# Patient Record
Sex: FEMALE | Race: WHITE | NOT HISPANIC OR LATINO | Employment: STUDENT | URBAN - METROPOLITAN AREA
[De-identification: names, ages, dates, MRNs, and addresses within clinical notes are randomized per-mention and may not be internally consistent; named-entity substitution may affect disease eponyms.]

---
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
## 2025-01-03 ENCOUNTER — NURSE ONLY (OUTPATIENT)
Age: 20
End: 2025-01-03
Attending: HOSPITALIST
Payer: MEDICAID

## 2025-01-03 ENCOUNTER — APPOINTMENT (OUTPATIENT)
Dept: FAMILY MEDICINE | Age: 20
End: 2025-01-03

## 2025-01-03 ENCOUNTER — OFFICE VISIT (OUTPATIENT)
Age: 20
End: 2025-01-03
Attending: STUDENT IN AN ORGANIZED HEALTH CARE EDUCATION/TRAINING PROGRAM
Payer: MEDICAID

## 2025-01-03 DIAGNOSIS — K31.819 GASTRIC AVM: Primary | ICD-10-CM

## 2025-01-03 NOTE — PROGRESS NOTES
Patient Name: Norm Roque  YOB: 2005  Date of Visit: 1/3/2025    Reason for visit: Ms. Roque was seen for the purposes of genetic counseling to rule out hereditary hemorrhagic telangiectasia (HHT) due to her recent diagnosis of 2 gastric AVMs at age 19y.    Referring Provider: Jhoan Adrian MD  Additional Provider(s): Irena Fay MD    Medical History: Ms. Roque is a pleasant 19 year old female. She recently presented to the [facility] ED on 2024 dt a c/o epigastric/abdominal pain and scant hematemesis. She reported a h/o H. Pylori infection and was seen in the [facility] ED 2 days prior where she had abdominal CT and lab work which was unremarkable. An EGD was performed which noted 2 AVMs, one medium sized in the gastric body and 1 small sized near the incisura, both of which were treated with APC and hemostatic clips put in place.    Ms. Roque denies any h/o epistaxis or cutaneous/mucosal telangiectases.     Ms. Roque achieved menarche at approximately 13 years of age, is pre-menopausal, and has never been pregnant. Ms. Roque has a 0-year history of oral contraceptive use and denies any fertility or hormone replacement use.      Relevant Family History: Ms. Roque has 2 sisters (21y, 17y) and 1 brother (7y).     Her mother (46y) is generally well. There are 3 maternal aunts and 4 maternal uncles. The MGMA is living in her 70s. The MGFA is  in his 60-70s dt a MI with a h/o HLD and fatty liver/cirrhosis.    Her father (44y) was dx with thyroid cancer ~40y s/p surgery. There is 1 paternal aunt and 1 paternal uncle. The PGMA is ~82y. The MGFA is  ~75y reportedly due to complications of bleeding gastric ulcers.     The rest of the family history is negative for other significant genetic conditions, cancers, or birth defects of any kind. See scanned pedigree for full family history reported during the session.    Ms. Roque's  maternal and paternal ethnicity is [place]. She notes consanguinity in her parents who are 1st cousins.    Summary: I reviewed with Ms. Roque that because she was found to have 2 gastric AVMs on the EGD done in the ED, she was referred to genetic counseling to r/o HHT. We discussed that gastrointestinal AVMs are one of the features suggestive of HHT, but they also can occur sporadically. Genetic testing of the genes causative of HHT can be considered in individuals with gastric AVMs. Such testing is often done as a multi-gene panel. We discussed general features of HHT and the possible implications for her should she be found to have it (need for additional workup to assess for presence of other AVMs/clinical features) and possible family implications (cascade testing for relatives). She was given printed information about HHT to take with her.    Clinical characteristics of hereditary hemorrhagic telangiectasia  Hereditary hemorrhagic telangiectasia (HHT, aka: Osler-Weber-Rendu) is an autosomal dominant condition that affects around 1 in 5000 individuals. HHT is characterized by the presence of multiple arteriovenous malformations (AVMs) that lack intervening capillaries and result in direct connections between arteries and veins. The most common clinical manifestation is spontaneous and recurrent nosebleeds (epistaxis) beginning on average at age 12 years. Telangiectases (small AVMs) are most evident on the lips, tongue, buccal mucosa, face, chest, and fingers. The average age of onset is generally later than epistaxis, but may be during childhood. Large AVMs often cause symptoms when they occur in the lungs, liver, or brain; complications from bleeding or shunting may be sudden and catastrophic. Approximately 25% of individuals with HHT have GI bleeding, which most commonly begins after age 50 years. Pulmonary hypertension is another pulmonary vascular manifestation of HHT, although it is much less common than  pulmonary AVMs. Individuals with HHT caused by SMAD4 mutations may also present with juvenile polyposis syndrome, as such increased endoscopic surveillance beginning in childhood is advised.    Diagnosis/testing  The diagnosis of HHT is established in a proband with three or more of the following clinical features: epistaxis, mucocutaneous telangiectases, visceral AVMs, and/or a family history of HHT. Identification of a heterozygous pathogenic variant in ACVRL1, ENG, GDF2, or SMAD4 establishes the diagnosis if clinical features are inconclusive.    Inheritance  HHT is inherited in an autosomal dominant manner with considerable intrafamilial variability. Most individuals have an affected parent. Each child of an affected individual and the sibs of most affected individuals are at a 50% risk of inheriting the pathogenic variant. Prenatal testing is possible for a pregnancy at increased risk if the pathogenic variant in the family is known. Molecular genetic testing is offered to at-risk family members if the germline pathogenic variant has been identified in the family. If the pathogenic variant in the family is not known, at-risk family members should be evaluated for signs and symptoms of HHT and screening should be offered to at-risk family members if the diagnosis cannot be ruled out.    Management  Treatment of manifestations: Nosebleeds are treated with humidification, nasal lubricants, hemostatic products, laser ablation, sclerotherapy, nasal closure, and oral or topical medications. GI bleeding is treated with iron replacement therapy and (if needed) endoscopic ablation, surgical resection of bleeding sites, and/or medical therapy. Pulmonary AVMs with a feeding vessel 1 mm or greater in diameter require consideration of occlusion. Cerebral AVMs are treated, when indicated by location or symptoms, by surgery, embolotherapy, and/or stereotactic radiosurgery. Symptomatic hepatic AVMs are managed medically; liver  transplantation is recommended for individuals who do not respond to medical therapy and who develop hepatic failure. Women with HHT considering pregnancy are screened and treated for pulmonary and cerebral AVMs; pulmonary AVMs discovered during pregnancy are treated during the second trimester. Iron replacement is preferred for anemia, but transfusion of packed red blood cells may be necessary for symptomatic anemia.    Prevention of secondary complications: When pulmonary AVMs are present or suspected, antibiotic prophylaxis for dental and invasive procedures and a filter on intravenous lines to prevent air bubbles from being inadvertently infused is recommended.    Surveillance: Annual evaluations for anemia; supine and sitting pulse-oximetry every one to two years during childhood; evaluation for pulmonary AVMs every five years with contrast echocardiography by age ten years; head MRI in infancy and then rescreening for cerebral AVMs by brain MRI after puberty; periodic screening for gastrointestinal polyps and malignant change in persons with juvenile polyps.    Agents/circumstances to avoid: Vigorous nose blowing; lifting heavy objects; straining during bowel movements; electric or chemical cautery for nosebleeds; anticoagulant and anti-inflammatory agents (including aspirin) in individuals with significant nose or GI bleeding; scuba diving unless contrast echocardiography within the last five years was negative for evidence of a right to left shunt; liver biopsy.    If testing is performed, three results are possible: positive, negative, and variant of uncertain significance.  A positive result indicates a pathogenic variant (harmful gene mutation) has been identified, and there is an increased risk for the cancers associated with the specific gene.  Since pathogenic variants in most HHT susceptibility genes are inherited in an autosomal dominant fashion, siblings and children of individuals with a pathogenic  variant have a 50% risk of carrying the same familial pathogenic variant as well.      A negative test result would indicate that no pathogenic variant was identified in a disease susceptibility gene.  While testing detects gene mutations, it is possible for a genetic variant to be present and go undetected.  It is also possible for a genetic variant to be located in a gene other than those being tested.     A variant of uncertain significance means that a change has been identified a disease susceptibility gene; however, it is uncertain if the variant is pathogenic or a non-deleterious (benign) change.  With time, the variant may be reclassified as either pathogenic or benign. This is generally treated as a negative results with no changes in medical management nor testing of family members typically advised.     Ms. Roque appeared to understand the information presented. On the day of the visit Ms. Roque elected to proceed with genetic testing for HHT. My office will call Ms. Roque as soon as results are received; post-test counseling can be scheduled at that time. Thank you for allowing me to participate in the care of your patient; please do not hesitate to contact my office if you have any questions or concerns, [phone].    Plan:   Blood was drawn and sent out for [facility]'s HHT and vascular malformations panel (6 genes, ACVRL1, ENG, EPHB4, GDF2, RASA1, SMAD4; TAT: ~2 weeks).    The Genetics office will call Ms. Roque when results are available.  Recommendations for Ms. Roque and family members will depend the above genetic testing results.      Send to: [facility]  Time spent with patient: 40 minutes      Gabriela Milligan MS, [facility]

## 2025-01-07 ENCOUNTER — APPOINTMENT (OUTPATIENT)
Dept: FAMILY MEDICINE | Age: 20
End: 2025-01-07

## 2025-01-13 ENCOUNTER — GENETICS ENCOUNTER (OUTPATIENT)
Dept: HEMATOLOGY/ONCOLOGY | Facility: HOSPITAL | Age: 20
End: 2025-01-13

## 2025-01-13 NOTE — PROGRESS NOTES
Patient Name: Norm Roque  YOB: 2005    Referring Provider: Jhoan Adrian MD  Additional Provider(s): Irena Fay MD     Reason for Referral:  Ms. Roque had genetic testing performed on 1/3/2025 to rule out hereditary hemorrhagic telangiectasia (HHT) due to her recent diagnosis of 2 gastric AVMs at age 19y.      Genetic Testing Result:  Negative. No pathogenic variant was found in the following 6 genes on the [facility] hereditary hemorrhagic telangiectasia and vascular malformations panel: ACVRL1, ENG, EPHB4, GDF2, RASA1, SMAD4. Please refer to the report from [facility] for additional testing information. These results were discussed with Ms. Roque via telephone on 1/13/2025.    Summary and Plan:   These results indicate that Ms. Roque was not found to have a pathogenic variant (harmful genetic mutation) in any of the genes listed above. No pathogenic variants associated with hereditary hemorrhagic telangiectasia (HHT) was identified. She was not found to have a genetic diagnosis of HHT. The limitations of the testing were discussed with Ms. Roque including the chance that a pathogenic variant in a gene other than those included in this analysis might be the cause of condition(s) in Ms. Roque or in relatives.     I encouraged Ms. Roque to share the genetic test results with her other relatives so that they may discuss the implications of this information with their health providers. Ms. Roque is also encouraged to contact me on an annual basis to learn if there have been any updates in genetic information that would apply or if there are changes in the personal and/or family history. Please do not hesitate to contact my office if you have any questions or concerns, [phone].     Gabriela Milligan MS, [facility]

## 2025-01-17 ENCOUNTER — APPOINTMENT (OUTPATIENT)
Dept: GASTROENTEROLOGY | Age: 20
End: 2025-01-17
Attending: STUDENT IN AN ORGANIZED HEALTH CARE EDUCATION/TRAINING PROGRAM

## 2025-01-17 ENCOUNTER — HOSPITAL ENCOUNTER (OUTPATIENT)
Facility: HOSPITAL | Age: 20
Setting detail: OBSERVATION
Discharge: HOME | End: 2025-01-20
Attending: PEDIATRICS | Admitting: HOSPITALIST
Payer: MEDICAID

## 2025-01-17 ENCOUNTER — APPOINTMENT (OUTPATIENT)
Dept: GENERAL RADIOLOGY | Facility: HOSPITAL | Age: 20
End: 2025-01-17
Attending: PEDIATRICS
Payer: MEDICAID

## 2025-01-17 VITALS
OXYGEN SATURATION: 100 % | HEART RATE: 83 BPM | DIASTOLIC BLOOD PRESSURE: 67 MMHG | SYSTOLIC BLOOD PRESSURE: 102 MMHG | BODY MASS INDEX: 17.66 KG/M2 | RESPIRATION RATE: 16 BRPM | HEIGHT: 62 IN | WEIGHT: 96 LBS

## 2025-01-17 DIAGNOSIS — K92.0 HEMATEMESIS WITH NAUSEA: ICD-10-CM

## 2025-01-17 DIAGNOSIS — Q27.30 AVM (ARTERIOVENOUS MALFORMATION) (CMD): ICD-10-CM

## 2025-01-17 DIAGNOSIS — K92.1 MELENA: ICD-10-CM

## 2025-01-17 DIAGNOSIS — R10.13 ABDOMINAL PAIN, EPIGASTRIC: ICD-10-CM

## 2025-01-17 DIAGNOSIS — K31.819 GASTRIC AVM: Primary | ICD-10-CM

## 2025-01-17 DIAGNOSIS — R10.13 EPIGASTRIC PAIN: ICD-10-CM

## 2025-01-17 DIAGNOSIS — K92.2 UGIB (UPPER GASTROINTESTINAL BLEED): Primary | ICD-10-CM

## 2025-01-17 LAB
ALBUMIN SERPL-MCNC: 4.4 G/DL (ref 3.2–4.8)
ALBUMIN/GLOB SERPL: 1.3 {RATIO} (ref 1–2)
ALP LIVER SERPL-CCNC: 42 U/L
ALT SERPL-CCNC: 8 U/L
ANION GAP SERPL CALC-SCNC: 6 MMOL/L (ref 0–18)
AST SERPL-CCNC: 12 U/L (ref ?–34)
B-HCG UR QL: NEGATIVE
BASOPHILS # BLD AUTO: 0.05 X10(3) UL (ref 0–0.2)
BASOPHILS NFR BLD AUTO: 0.7 %
BILIRUB SERPL-MCNC: 0.7 MG/DL (ref 0.3–1.2)
BUN BLD-MCNC: 10 MG/DL (ref 9–23)
CALCIUM BLD-MCNC: 10 MG/DL (ref 8.7–10.6)
CHLORIDE SERPL-SCNC: 106 MMOL/L (ref 98–112)
CO2 SERPL-SCNC: 24 MMOL/L (ref 21–32)
CREAT BLD-MCNC: 0.79 MG/DL
EGFRCR SERPLBLD CKD-EPI 2021: 110 ML/MIN/1.73M2 (ref 60–?)
EOSINOPHIL # BLD AUTO: 0.13 X10(3) UL (ref 0–0.7)
EOSINOPHIL NFR BLD AUTO: 1.7 %
ERYTHROCYTE [DISTWIDTH] IN BLOOD BY AUTOMATED COUNT: 15 %
GLOBULIN PLAS-MCNC: 3.5 G/DL (ref 2–3.5)
GLUCOSE BLD-MCNC: 84 MG/DL (ref 70–99)
HCT VFR BLD AUTO: 39.9 %
HGB BLD-MCNC: 11.7 G/DL
HGB BLD-MCNC: 13.1 G/DL
IMM GRANULOCYTES # BLD AUTO: 0.02 X10(3) UL (ref 0–1)
IMM GRANULOCYTES NFR BLD: 0.3 %
LIPASE SERPL-CCNC: 65 U/L (ref 12–53)
LYMPHOCYTES # BLD AUTO: 2.38 X10(3) UL (ref 1.5–5)
LYMPHOCYTES NFR BLD AUTO: 31.7 %
MCH RBC QN AUTO: 26 PG (ref 26–34)
MCHC RBC AUTO-ENTMCNC: 32.8 G/DL (ref 31–37)
MCV RBC AUTO: 79.2 FL
MONOCYTES # BLD AUTO: 0.65 X10(3) UL (ref 0.1–1)
MONOCYTES NFR BLD AUTO: 8.7 %
NEUTROPHILS # BLD AUTO: 4.28 X10 (3) UL (ref 1.5–7.7)
NEUTROPHILS # BLD AUTO: 4.28 X10(3) UL (ref 1.5–7.7)
NEUTROPHILS NFR BLD AUTO: 56.9 %
OSMOLALITY SERPL CALC.SUM OF ELEC: 280 MOSM/KG (ref 275–295)
PLATELET # BLD AUTO: 248 10(3)UL (ref 150–450)
POTASSIUM SERPL-SCNC: 3.7 MMOL/L (ref 3.5–5.1)
PROT SERPL-MCNC: 7.9 G/DL (ref 5.7–8.2)
RBC # BLD AUTO: 5.04 X10(6)UL
SODIUM SERPL-SCNC: 136 MMOL/L (ref 136–145)
WBC # BLD AUTO: 7.5 X10(3) UL (ref 4–11)

## 2025-01-17 PROCEDURE — 74019 RADEX ABDOMEN 2 VIEWS: CPT | Performed by: PEDIATRICS

## 2025-01-17 PROCEDURE — 99223 1ST HOSP IP/OBS HIGH 75: CPT | Performed by: HOSPITALIST

## 2025-01-17 PROCEDURE — 99204 OFFICE O/P NEW MOD 45 MIN: CPT

## 2025-01-17 RX ORDER — SODIUM CHLORIDE, SODIUM LACTATE, POTASSIUM CHLORIDE, CALCIUM CHLORIDE 600; 310; 30; 20 MG/100ML; MG/100ML; MG/100ML; MG/100ML
INJECTION, SOLUTION INTRAVENOUS CONTINUOUS
Status: DISCONTINUED | OUTPATIENT
Start: 2025-01-17 | End: 2025-01-18

## 2025-01-17 RX ORDER — DOXYCYCLINE 100 MG/1
100 CAPSULE ORAL EVERY 12 HOURS SCHEDULED
Status: DISCONTINUED | OUTPATIENT
Start: 2025-01-17 | End: 2025-01-20

## 2025-01-17 RX ORDER — ACETAMINOPHEN 500 MG
500 TABLET ORAL EVERY 4 HOURS PRN
Status: DISCONTINUED | OUTPATIENT
Start: 2025-01-17 | End: 2025-01-17

## 2025-01-17 RX ORDER — METRONIDAZOLE 500 MG/1
500 TABLET ORAL 2 TIMES DAILY
Status: DISCONTINUED | OUTPATIENT
Start: 2025-01-17 | End: 2025-01-20

## 2025-01-17 RX ORDER — SODIUM PHOSPHATE, DIBASIC AND SODIUM PHOSPHATE, MONOBASIC 7; 19 G/230ML; G/230ML
1 ENEMA RECTAL ONCE AS NEEDED
Status: DISCONTINUED | OUTPATIENT
Start: 2025-01-17 | End: 2025-01-20

## 2025-01-17 RX ORDER — MIRTAZAPINE 7.5 MG/1
7.5 TABLET, FILM COATED ORAL NIGHTLY
Status: DISCONTINUED | OUTPATIENT
Start: 2025-01-17 | End: 2025-01-20

## 2025-01-17 RX ORDER — ONDANSETRON 2 MG/ML
4 INJECTION INTRAMUSCULAR; INTRAVENOUS EVERY 6 HOURS PRN
Status: DISCONTINUED | OUTPATIENT
Start: 2025-01-17 | End: 2025-01-20

## 2025-01-17 RX ORDER — MORPHINE SULFATE 4 MG/ML
1 INJECTION, SOLUTION INTRAMUSCULAR; INTRAVENOUS EVERY 2 HOUR PRN
Status: DISCONTINUED | OUTPATIENT
Start: 2025-01-17 | End: 2025-01-20

## 2025-01-17 RX ORDER — MORPHINE SULFATE 4 MG/ML
2 INJECTION, SOLUTION INTRAMUSCULAR; INTRAVENOUS EVERY 2 HOUR PRN
Status: DISCONTINUED | OUTPATIENT
Start: 2025-01-17 | End: 2025-01-20

## 2025-01-17 RX ORDER — ECHINACEA PURPUREA EXTRACT 125 MG
1 TABLET ORAL
Status: DISCONTINUED | OUTPATIENT
Start: 2025-01-17 | End: 2025-01-20

## 2025-01-17 RX ORDER — BISACODYL 10 MG
10 SUPPOSITORY, RECTAL RECTAL
Status: DISCONTINUED | OUTPATIENT
Start: 2025-01-17 | End: 2025-01-20

## 2025-01-17 RX ORDER — PROCHLORPERAZINE EDISYLATE 5 MG/ML
5 INJECTION INTRAMUSCULAR; INTRAVENOUS EVERY 8 HOURS PRN
Status: DISCONTINUED | OUTPATIENT
Start: 2025-01-17 | End: 2025-01-20

## 2025-01-17 RX ORDER — POLYETHYLENE GLYCOL 3350 17 G/17G
17 POWDER, FOR SOLUTION ORAL DAILY PRN
Status: DISCONTINUED | OUTPATIENT
Start: 2025-01-17 | End: 2025-01-20

## 2025-01-17 RX ORDER — SENNOSIDES 8.6 MG
17.2 TABLET ORAL NIGHTLY PRN
Status: DISCONTINUED | OUTPATIENT
Start: 2025-01-17 | End: 2025-01-20

## 2025-01-17 RX ORDER — MORPHINE SULFATE 4 MG/ML
0.5 INJECTION, SOLUTION INTRAMUSCULAR; INTRAVENOUS EVERY 2 HOUR PRN
Status: DISCONTINUED | OUTPATIENT
Start: 2025-01-17 | End: 2025-01-20

## 2025-01-17 RX ORDER — PANTOPRAZOLE SODIUM 40 MG/1
40 TABLET, DELAYED RELEASE ORAL
Status: DISCONTINUED | OUTPATIENT
Start: 2025-01-17 | End: 2025-01-17

## 2025-01-17 RX ORDER — ACETAMINOPHEN 500 MG
500 TABLET ORAL EVERY 4 HOURS PRN
Status: DISCONTINUED | OUTPATIENT
Start: 2025-01-17 | End: 2025-01-20

## 2025-01-17 ASSESSMENT — ENCOUNTER SYMPTOMS
APPETITE CHANGE: 1
UNEXPECTED WEIGHT CHANGE: 1
FATIGUE: 1
PSYCHIATRIC NEGATIVE: 1
RESPIRATORY NEGATIVE: 1
VOMITING: 1
NAUSEA: 1

## 2025-01-17 NOTE — ED PROVIDER NOTES
Patient Seen in: [facility] Emergency Department      History     Chief Complaint   Patient presents with    Abdomen/Flank Pain    Nausea/Vomiting/Diarrhea     Stated Complaint: abdominal pain, vomiting    Subjective:   19-year-old female with a history of gastric AVM status post endoscopy by GI with clip placement about a month ago referred to the ED by PCP due to concern for progressively worsening epigastric abdominal pain along with intermittent hematemesis within the last few days.  No associated fevers diarrhea or URI symptoms.              Objective:     Past Medical History:    Gastric AVM    Upper GI bleed              Past Surgical History:   Procedure Laterality Date    Anesth,intestine endoscopy                  Social History     Socioeconomic History    Marital status: Single   Tobacco Use    Smoking status: Never    Smokeless tobacco: Never   Vaping Use    Vaping status: Never Used   Substance and Sexual Activity    Alcohol use: Never    Drug use: Never     Social Drivers of Health     Financial Resource Strain: Not on File (10/7/2022)    Received from [facility]    Financial Resource Strain     Financial Resource Strain: 0   Food Insecurity: Low Risk  (12/30/2024)    Received from [facility]    Food Insecurity     Within the past 12 months, you worried that your food would run out before you got money to buy more.  : Never true     Within the past 12 months, the food you bought just didn't last and you didn't have money to get more. : Never true   Transportation Needs: Not At Risk (12/30/2024)    Received from [facility]    Transportation Needs     In the past 12 months, has lack of reliable transportation kept you from medical appointments, meetings, work or from getting things needed for daily living? : No   Physical Activity: Not on File (10/7/2022)    Received from [facility]    Physical Activity     Physical Activity: 0   Stress: Not on File (10/7/2022)    Received from  [facility]    Stress     Stress: 0   Social Connections: Not on File (9/24/2024)    Received from [facility]    Social Soweso     Connectedness: 0   Housing Stability: Low Risk  (12/21/2024)    Housing Stability     Housing Instability: No                  Physical Exam     ED Triage Vitals   BP 01/17/25 1206 108/69   Pulse 01/17/25 1206 83   Resp 01/17/25 1206 12   Temp 01/17/25 1207 98.7 °F (37.1 °C)   Temp src 01/17/25 1207 Oral   SpO2 01/17/25 1206 97 %   O2 Device 01/17/25 1206 None (Room air)       Current Vitals:   Vital Signs  BP: 106/68  Pulse: 92  Resp: 18  Temp: 98.7 °F (37.1 °C)  Temp src: Oral    Oxygen Therapy  SpO2: 100 %  O2 Device: None (Room air)        Physical Exam  Vitals and nursing note reviewed.   Constitutional:       General: She is not in acute distress.     Appearance: Normal appearance. She is ill-appearing. She is not toxic-appearing or diaphoretic.      Comments: Appears ill however nontoxic   HENT:      Head: Normocephalic and atraumatic.      Nose: Nose normal.      Mouth/Throat:      Mouth: Mucous membranes are moist.      Pharynx: Oropharynx is clear.   Eyes:      Extraocular Movements: Extraocular movements intact.      Conjunctiva/sclera: Conjunctivae normal.      Pupils: Pupils are equal, round, and reactive to light.   Cardiovascular:      Rate and Rhythm: Normal rate and regular rhythm.      Pulses: Normal pulses.      Heart sounds: Normal heart sounds.   Pulmonary:      Effort: Pulmonary effort is normal.      Breath sounds: Normal breath sounds.   Abdominal:      Palpations: Abdomen is soft.      Tenderness: There is abdominal tenderness. There is no right CVA tenderness, left CVA tenderness, guarding or rebound.      Comments: Significant epigastric tenderness however no rebound or guarding   Musculoskeletal:         General: Normal range of motion.      Cervical back: Normal range of motion and neck supple.   Skin:     General: Skin is warm.      Capillary Refill: Capillary  refill takes less than 2 seconds.      Coloration: Skin is pale.   Neurological:      General: No focal deficit present.      Mental Status: She is alert and oriented to person, place, and time.             ED Course     Labs Reviewed   COMP METABOLIC PANEL (14) - Abnormal; Notable for the following components:       Result Value    ALT 8 (*)     Alkaline Phosphatase 42 (*)     All other components within normal limits   CBC WITH DIFFERENTIAL WITH PLATELET - Abnormal; Notable for the following components:    MCV 79.2 (*)     All other components within normal limits   LIPASE - Abnormal; Notable for the following components:    Lipase 65 (*)     All other components within normal limits       ED Course as of 01/17/25 1325  ------------------------------------------------------------  Time: 01/17 1253  Comment: H/H stable now. Per GI Dr Shaw, recommends admission for serial H/Hs, IVFs and observation. Possible re-bleed, possible worsening H.Pylori/Gastritis.  ------------------------------------------------------------  Time: 01/17 1256  Comment: I discussed the case with the hospitalist who accepts the admission.  Will also provide a dose of IV Protonix.  ------------------------------------------------------------  Time: 01/17 1314  Comment: Serum lab work notable for elevated lipase.  Abdominal x-ray is without signs of obstruction or free air       Assessment & Plan: Concern for hematemesis with worsening epigastric abdominal pain, concern for rebleed of AVM.  Will obtain serum lab work stat obstructive series and discussed with GI.     Independent historian:   Pertinent co-morbidities affecting presentation: Hx of gastric AVM with clips  Differential diagnoses considered: I considered various etiologies / differetial diagosis including but not limited to, worsening gastritis, AVM bleed. The patient was well-appearing and did not show any evidence of serious bacterial infection.  Diagnostic tests considered but  not performed: Abdominal CT -low suspicion for acute abdomen    ED Course:    Prescription drug management considerations: IV Protonix  Consideration regarding hospitalization or escalation of care: admission  Social determinants of health: None       I have considered other serious etiologies for this patient's complaints, however the presentation is not consistent with such entities. Patient was screened and evaluated during this visit.   As a treating physician attending to the patient, I determined, within reasonable clinical confidence and prior to discharge, that an emergency medical condition was not or was no longer present. Patient or caregiver understands the course of events that occurred in the emergency department. Instructions when to seek emergent medical care was reviewed. Advised parent or caregiver to follow up with primary care physician.        This report has been produced using speech recognition software and may contain errors related to that system including, but not limited to, errors in grammar, punctuation, and spelling, as well as words and phrases that possibly may have been recognized inappropriately.  If there are any questions or concerns, contact the dictating provider for clarification.         MDM    Radiology:  Imaging ordered independently visualized and interpreted by myself (along with review of radiologist's interpretation) and noted the following: AXRs without signs of obstruction or free air    XR ABDOMEN OBSTRUCTIVE SERIES ROUTINE(2 VW)(CPT=74019)    Result Date: 1/17/2025  CONCLUSION:  Bowel gas pattern is unremarkable.   LOCATION:  [place] [id number]    Dictated by (CST): Bakari Stover MD on 1/17/2025 at 1:22 PM     Finalized by (CST): Bakari Stover MD on 1/17/2025 at 1:23 PM        Labs:  ^^ Personally ordered, reviewed, and interpreted all unique tests ordered.  Clinically significant labs noted: serum lab work notable for mildly elevated lipase    Medications  administered:  Medications   sodium chloride 0.9 % IV bolus 1,000 mL (1,000 mL Intravenous New Bag 1/17/25 1229)   pantoprazole (Protonix) 40 mg in sodium chloride 0.9% PF 10 mL IV push (40 mg Intravenous Given 1/17/25 1312)       Pulse oximetry:  Pulse oximetry on room air is 100% and is normal.     Cardiac monitoring:  Initial heart rate is 78 and is normal for age    Vital signs:  Vitals:    01/17/25 1206 01/17/25 1207 01/17/25 1213 01/17/25 1316   BP: 108/69  113/76 106/68   Pulse: 83  78 92   Resp: 12   18   Temp:  98.7 °F (37.1 °C)     TempSrc:  Oral     SpO2: 97%  100% 100%   Weight: 43 kg      Height: 157.5 cm (5' 2\")          Chart review:  ^^ Review of prior external notes from unique sources (non-[facility] ED records): noted in history : PCP office visit today for abdominal pain 1/17/25    Disposition and Plan     Clinical Impression:  1. Gastric AVM    2. Abdominal pain, epigastric    3. Hematemesis with nausea         Disposition:  Admit  1/17/2025 12:56 pm          Medications Prescribed:  Current Discharge Medication List              Supplementary Documentation:         Hospital Problems       Present on Admission  Date Reviewed: 1/3/2025   None

## 2025-01-17 NOTE — ED QUICK NOTES
Orders for admission, patient is aware of plan and ready to go upstairs. Any questions, please call ED RN Lurdes at extension [phone].     Patient Covid vaccination status: Unvaccinated     COVID Test Ordered in ED: None    COVID Suspicion at Admission: N/A    Running Infusions:      Mental Status/LOC at time of transport: A&0x4    Other pertinent information:   CIWA score: N/A   NIH score:  N/A

## 2025-01-17 NOTE — PLAN OF CARE
Received patient from ER.Patient AXOX4.On room air.C/o epigastric pain,Continent of bowel and bladder.On Clear liquid diet.NPO at mn.Plan of care updated.Admission navigator completed.IVF infusing.Call light within reach.  Problem: Patient/Family Goals  Goal: Patient/Family Long Term Goal  Description: Patient's Long Term Goal: Stay healthy    Interventions:  - Medication management  -Dietary management  -Prompt follow up with physicians  - See additional Care Plan goals for specific interventions  Outcome: Progressing  Goal: Patient/Family Short Term Goal  Description: Patient's Short Term Goal: discharge home    Interventions:   - IVF  -GI to see  - See additional Care Plan goals for specific interventions  Outcome: Progressing     Problem: GASTROINTESTINAL - ADULT  Goal: Minimal or absence of nausea and vomiting  Description: INTERVENTIONS:  - Maintain adequate hydration with IV or PO as ordered and tolerated  - Nasogastric tube to low intermittent suction as ordered  - Evaluate effectiveness of ordered antiemetic medications  - Provide nonpharmacologic comfort measures as appropriate  - Advance diet as tolerated, if ordered  - Obtain nutritional consult as needed  - Evaluate fluid balance  Outcome: Progressing  Goal: Maintains or returns to baseline bowel function  Description: INTERVENTIONS:  - Assess bowel function  - Maintain adequate hydration with IV or PO as ordered and tolerated  - Evaluate effectiveness of GI medications  - Encourage mobilization and activity  - Obtain nutritional consult as needed  - Establish a toileting routine/schedule  - Consider collaborating with pharmacy to review patient's medication profile  Outcome: Progressing  Goal: Maintains adequate nutritional intake (undernourished)  Description: INTERVENTIONS:  - Monitor percentage of each meal consumed  - Identify factors contributing to decreased intake, treat as appropriate  - Assist with meals as needed  - Monitor I&O, WT and lab  values  - Obtain nutritional consult as needed  - Optimize oral hygiene and moisture  - Encourage food from home; allow for food preferences  - Enhance eating environment  Outcome: Progressing  Goal: Achieves appropriate nutritional intake (bariatric)  Description: INTERVENTIONS:  - Monitor for over-consumption  - Identify factors contributing to increased intake, treat as appropriate  - Monitor I&O, WT and lab values  - Obtain nutritional consult as needed  - Evaluate psychosocial factors contributing to over-consumption  Outcome: Progressing

## 2025-01-17 NOTE — ED INITIAL ASSESSMENT (HPI)
Patient has a history of gastric AVM, endoscopy and clips 12/21/24. States she's has upper abdominal pain and vomited blood X2.

## 2025-01-17 NOTE — CONSULTS
[facility]  GASTROENTEROLOGY NEW CONSULT NOTE   [facility]     Norm Roque Patient Status:  Observation    3/25/2005 MRN [id number]   Location [facility] 0SW-A Attending Rodolfo Fernandez MD   Hosp Day # 0 PCP None Pcp       Reason for Consultation:   Hematemesis, abdominal pain    History of Present Illness (HPI):  Norm Roque is a 19 year old female with chronic medical conditions including H. 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  Patient was recently admitted in 2024 for hematemesis and at that time was found to have 2 AVMs in the stomach which were treated using APC and Endo Clip.  H. pylori stool antigen was performed which was positive and she was started on antibiotics.  She notes she was able to take the antibiotics as prescribed for 1 week, but due to significant pain she has only been now taking this twice a day rather than 4 times per day.  She has 1 to 2 days left of the treatment.  She notes pain occurs every day and she has had nausea and emesis every day.  She has been throwing up blood on a daily basis since the procedure.  She notes the blood is bright red in color.  Hemoglobin on admission was within normal limits at 13.1.  Lipase minimally elevated at 65.  CMP was unremarkable but did show low alkaline phosphatase level.  Negative pregnancy test.  Abdominal x-ray on admission was unremarkable.  She notes she has lost about 3 kg since December due to being unable to eat.    Past Medical History:  Past Medical History:    Gastric AVM    Upper GI bleed       Past Surgical History:  Past Surgical History:   Procedure Laterality Date    Anesth,intestine endoscopy         Family History:  No first-degree relative with a history of colorectal cancer.    Social History:  No IVDU      Medications:    [COMPLETED] sodium  chloride 0.9 % IV bolus 1,000 mL  1,000 mL Intravenous Once    [COMPLETED] pantoprazole (Protonix) 40 mg in sodium chloride 0.9% PF 10 mL IV push  40 mg Intravenous Once    mirtazapine (Remeron) tab 7.5 mg  7.5 mg Oral Nightly    doxycycline (Vibramycin) cap 100 mg  100 mg Oral 2 times per day    metroNIDAZOLE (Flagyl) tab 500 mg  500 mg Oral BID    melatonin tab 3 mg  3 mg Oral Nightly PRN    glycerin-hypromellose- (Artificial Tears) 0.2-0.2-1 % ophthalmic solution 1 drop  1 drop Both Eyes QID PRN    sodium chloride (Saline Mist) 0.65 % nasal solution 1 spray  1 spray Each Nare Q3H PRN    lactated ringers infusion   Intravenous Continuous    polyethylene glycol (PEG 3350) (Miralax) 17 g oral packet 17 g  17 g Oral Daily PRN    sennosides (Senokot) tab 17.2 mg  17.2 mg Oral Nightly PRN    bisacodyl (Dulcolax) 10 MG rectal suppository 10 mg  10 mg Rectal Daily PRN    fleet enema (Fleet) rectal enema 133 mL  1 enema Rectal Once PRN    ondansetron (Zofran) 4 MG/2ML injection 4 mg  4 mg Intravenous Q6H PRN    prochlorperazine (Compazine) 10 MG/2ML injection 5 mg  5 mg Intravenous Q8H PRN    morphINE PF 4 MG/ML injection 0.52 mg  0.52 mg Intravenous Q2H PRN    Or    morphINE PF 4 MG/ML injection 1 mg  1 mg Intravenous Q2H PRN    Or    morphINE PF 4 MG/ML injection 2 mg  2 mg Intravenous Q2H PRN    acetaminophen (Tylenol Extra Strength) tab 500 mg  500 mg Oral Q4H PRN    pantoprazole (Protonix) 40 mg in sodium chloride 0.9% PF 10 mL IV push  40 mg Intravenous Q12H       Allergies:  Allergies[1]    Review of Systems  Negative unless otherwise mentioned in HPI.     Physical Exam  /68   Pulse 92   Temp 98.3 °F (36.8 °C) (Oral)   Resp 18   Ht 5' 2\" (1.575 m)   Wt 97 lb 11.2 oz (44.3 kg)   LMP 12/28/2024   SpO2 100%   BMI 17.87 kg/m²   Body mass index is 17.87 kg/m².      Gen: Awake and alert, NAD  Eyes:  no scleral icterus  Cardiovascular: Warm, well-perfused  Respiratory: No respiratory distress  Abd:  Soft, mild tenderness palpation epigastric region, non-distended. No rebound tenderness, no guarding.  Neuro:  Alert and oriented to name, location and time.     Diagnostic Data:      Labs:  Recent Labs   Lab 01/17/25  1226   WBC 7.5   HGB 13.1   MCV 79.2*   .0       Recent Labs   Lab 01/17/25  1226   GLU 84   BUN 10   CREATSERUM 0.79   CA 10.0   ALB 4.4      K 3.7      CO2 24.0   ALKPHO 42*   AST 12   ALT 8*   BILT 0.7   TP 7.9       No results for input(s): \"PTP\", \"INR\" in the last 168 hours.           Imaging: Imaging data reviewed in Epic.    Medications:    mirtazapine  7.5 mg Oral Nightly    doxycycline  100 mg Oral 2 times per day    metroNIDAZOLE  500 mg Oral BID    pantoprazole  40 mg Intravenous Q12H       Allergies:  Allergies[2]    Imaging:  I have personally reviewed all pertinent available imaging.       ASSESSMENT / PLAN:     Norm Roque is a 19 year old female with chronic medical conditions including H. pylori currently on treatment and recent gastric AVMs status post APC and clipping on December 21, 2024 that presented with persistent epigastric abdominal discomfort, nausea, and hematemesis.    Assessment:  Persistent epigastric abdominal pain and nausea.  Suspect this is likely secondary to her recently diagnosed H. pylori infection.  She has been unable to complete the treatment as prescribed and is only taking this twice per day rather than 4 times per day.  Hematemesis-suspect possible small Diane-Villalobos tear, however she does have a recent history of gastric AVM.  Despite the emesis however she has not had a drop in her hemoglobin.  Recent H. pylori infection  Recent gastric AVM  Low alkaline phosphatase-suspect this is likely due to nausea and emesis and malnutrition  Weight loss-likely secondary to persistent nausea and emesis      Recommendations:   GI soft diet as tolerated.  N.p.o. after midnight for potential EGD  Pantoprazole 40 mg IV twice daily  Given  persistence of her nausea along with continued hematemesis will tentatively plan for repeat EGD tomorrow given her previous gastric AVMs.  Advised importance of trying to stay compliant with H. pylori therapy.  Doxycycline and Flagyl continued here on admission  Monitor hemoglobin twice daily and transfuse for hemoglobin less than 7  CBC and BMP in the morning    Thank you for the consult and allowing us to participate in patient's care.  GI will follow. Please page with any questions or concerns.     Abel Conteh DO  1/17/2025  [facility]             [1] No Known Allergies  [2] No Known Allergies

## 2025-01-18 ENCOUNTER — ANESTHESIA EVENT (OUTPATIENT)
Dept: ENDOSCOPY | Facility: HOSPITAL | Age: 20
End: 2025-01-18
Payer: MEDICAID

## 2025-01-18 ENCOUNTER — ANESTHESIA (OUTPATIENT)
Dept: ENDOSCOPY | Facility: HOSPITAL | Age: 20
End: 2025-01-18
Payer: MEDICAID

## 2025-01-18 LAB
ANION GAP SERPL CALC-SCNC: 8 MMOL/L (ref 0–18)
BASOPHILS # BLD AUTO: 0.04 X10(3) UL (ref 0–0.2)
BASOPHILS NFR BLD AUTO: 0.5 %
BUN BLD-MCNC: 8 MG/DL (ref 9–23)
CALCIUM BLD-MCNC: 9.1 MG/DL (ref 8.7–10.6)
CHLORIDE SERPL-SCNC: 109 MMOL/L (ref 98–112)
CO2 SERPL-SCNC: 24 MMOL/L (ref 21–32)
CREAT BLD-MCNC: 0.76 MG/DL
EGFRCR SERPLBLD CKD-EPI 2021: 116 ML/MIN/1.73M2 (ref 60–?)
EOSINOPHIL # BLD AUTO: 0.34 X10(3) UL (ref 0–0.7)
EOSINOPHIL NFR BLD AUTO: 4.5 %
ERYTHROCYTE [DISTWIDTH] IN BLOOD BY AUTOMATED COUNT: 15 %
GLUCOSE BLD-MCNC: 81 MG/DL (ref 70–99)
HCT VFR BLD AUTO: 38.3 %
HGB BLD-MCNC: 12.2 G/DL
IMM GRANULOCYTES # BLD AUTO: 0.01 X10(3) UL (ref 0–1)
IMM GRANULOCYTES NFR BLD: 0.1 %
LYMPHOCYTES # BLD AUTO: 2.74 X10(3) UL (ref 1.5–5)
LYMPHOCYTES NFR BLD AUTO: 35.9 %
MAGNESIUM SERPL-MCNC: 1.9 MG/DL (ref 1.6–2.6)
MCH RBC QN AUTO: 25.8 PG (ref 26–34)
MCHC RBC AUTO-ENTMCNC: 31.9 G/DL (ref 31–37)
MCV RBC AUTO: 81 FL
MONOCYTES # BLD AUTO: 0.88 X10(3) UL (ref 0.1–1)
MONOCYTES NFR BLD AUTO: 11.5 %
NEUTROPHILS # BLD AUTO: 3.62 X10 (3) UL (ref 1.5–7.7)
NEUTROPHILS # BLD AUTO: 3.62 X10(3) UL (ref 1.5–7.7)
NEUTROPHILS NFR BLD AUTO: 47.5 %
OSMOLALITY SERPL CALC.SUM OF ELEC: 289 MOSM/KG (ref 275–295)
PLATELET # BLD AUTO: 230 10(3)UL (ref 150–450)
POTASSIUM SERPL-SCNC: 3.8 MMOL/L (ref 3.5–5.1)
RBC # BLD AUTO: 4.73 X10(6)UL
SODIUM SERPL-SCNC: 141 MMOL/L (ref 136–145)
WBC # BLD AUTO: 7.6 X10(3) UL (ref 4–11)

## 2025-01-18 PROCEDURE — 99232 SBSQ HOSP IP/OBS MODERATE 35: CPT | Performed by: HOSPITALIST

## 2025-01-18 PROCEDURE — 0W3P8ZZ CONTROL BLEEDING IN GASTROINTESTINAL TRACT, VIA NATURAL OR ARTIFICIAL OPENING ENDOSCOPIC: ICD-10-PCS | Performed by: INTERNAL MEDICINE

## 2025-01-18 DEVICE — REPLAY HEMOSTASIS CLIP, 11MM SPAN
Type: IMPLANTABLE DEVICE
Brand: REPLAY

## 2025-01-18 RX ORDER — HYDROCODONE BITARTRATE AND ACETAMINOPHEN 5; 325 MG/1; MG/1
2 TABLET ORAL EVERY 4 HOURS PRN
Status: DISCONTINUED | OUTPATIENT
Start: 2025-01-18 | End: 2025-01-20

## 2025-01-18 RX ORDER — SODIUM CHLORIDE, SODIUM LACTATE, POTASSIUM CHLORIDE, CALCIUM CHLORIDE 600; 310; 30; 20 MG/100ML; MG/100ML; MG/100ML; MG/100ML
INJECTION, SOLUTION INTRAVENOUS CONTINUOUS
Status: DISCONTINUED | OUTPATIENT
Start: 2025-01-18 | End: 2025-01-18

## 2025-01-18 RX ORDER — ACETAMINOPHEN 500 MG
TABLET ORAL
Status: COMPLETED
Start: 2025-01-18 | End: 2025-01-18

## 2025-01-18 RX ORDER — NALOXONE HYDROCHLORIDE 0.4 MG/ML
80 INJECTION, SOLUTION INTRAMUSCULAR; INTRAVENOUS; SUBCUTANEOUS AS NEEDED
Status: ACTIVE | OUTPATIENT
Start: 2025-01-18 | End: 2025-01-18

## 2025-01-18 RX ORDER — LIDOCAINE HYDROCHLORIDE 10 MG/ML
INJECTION, SOLUTION EPIDURAL; INFILTRATION; INTRACAUDAL; PERINEURAL AS NEEDED
Status: DISCONTINUED | OUTPATIENT
Start: 2025-01-18 | End: 2025-01-18 | Stop reason: SURG

## 2025-01-18 RX ORDER — HYDROCODONE BITARTRATE AND ACETAMINOPHEN 5; 325 MG/1; MG/1
1 TABLET ORAL EVERY 4 HOURS PRN
Status: DISCONTINUED | OUTPATIENT
Start: 2025-01-18 | End: 2025-01-20

## 2025-01-18 RX ADMIN — SODIUM CHLORIDE, SODIUM LACTATE, POTASSIUM CHLORIDE, CALCIUM CHLORIDE: 600; 310; 30; 20 INJECTION, SOLUTION INTRAVENOUS at 11:13:00

## 2025-01-18 RX ADMIN — LIDOCAINE HYDROCHLORIDE 50 MG: 10 INJECTION, SOLUTION EPIDURAL; INFILTRATION; INTRACAUDAL; PERINEURAL at 10:56:00

## 2025-01-18 RX ADMIN — SODIUM CHLORIDE, SODIUM LACTATE, POTASSIUM CHLORIDE, CALCIUM CHLORIDE: 600; 310; 30; 20 INJECTION, SOLUTION INTRAVENOUS at 10:56:00

## 2025-01-18 NOTE — OPERATIVE REPORT
EGD Operative Report  Patient Name: Norm Roque  YOB: 2005  MRN: [id number]  Procedure: Esophagogastroduodenoscopy (EGD)  with APC, 4 hemostatic clips placed, and gold probe cautery  Pre-operative Diagnosis & Indication: hematemesis  Post-operative Diagnosis:   Gastric AVM, s/p APC, 4 hemostatic clips placed, and gold probe cautery  Attending Endoscopist: Tim Alexandre MD  Informed Consent: The planned procedure(s), the explanation of the procedure, its expected benefits, the potential complications and risks and possible alternatives and their benefits and risks were discussed with the patient or the patient's surrogate. The discussion of risks, not limited to but including bleeding, infection, perforation, adverse effects from anesthesia, need for emergency surgery/prolonged hospitalization,  cardiac arrhythmias,  and aspiration were discussed with patient.  Pt and/or surrogate understood the proposed procedure(s), its risks, benefits and alternatives and wish to proceed with procedure(s). All questions answered in full.  After all questions were answered to their satisfaction, a signed, informed, and witnessed consent was obtained.  Physical Exam: Heart: regular rate and rhythm. No rubs, murmurs, or gallops. Lungs: Clear to auscultation bilaterally. Abdomen: Soft, non-tender, non distended. No rebound tenderness, no guarding.   A TIME OUT WAS COMPLETED prior to the procedure to confirm the patient, procedure(s) and complete endoscopy safety procedure.  Sedation: Monitored Anesthesia Care; ASA class per anesthesiology team   Monitoring: Pulsoximetry, pulse, respirations, and blood pressure , vitals were monitored throughout the entire procedure under monitored anesthesia care.   Procedure: The patient was then brought to the endoscopy suite where his/her pulse, pulse oximetry and blood pressure were monitored. The patient was placed in the left lateral decubitus position and deep sedation was  administered. Once adequate sedation was achieved, a bite block was placed and a lubricated tip of an Olympus video upper endoscope was inserted through the oropharynx and gently manipulated through the esophagus into the stomach and the second portion of the duodenum. Upon withdrawal of the endoscope, careful visualization of the mucosa was performed. The endoscope was then withdrawn into the gastric antrum and placed in a retroflexed position.  The endoscope was then righted, and air was suctioned from the stomach.  The endoscope was then withdrawn from the patient, with careful visual inspection of the mucosa. The patient left the procedure room in stable condition for recovery. Findings and endotherapy as listed below  Toleration: Patient tolerated procedure well   Complications: No immediate complications   Technical Difficulty:  The procedure was not technically difficult   Estimated Blood Loss: Minimal, less than 5mL of estimated blood loss.   Findings and Therapeutics:  Esophagus:   The mucosa was normal.   There were no strictures or stenosis. GEJ junction traversed with endoscope without resistance.  The Z-line was irregular, appreciated at 35 cm from the incisors.      Stomach:    The was a clip seen in the gastric body and a clip seen in the antrum, with some surrounding granulation tissue at each site, but no bleeding or AVMs noted. In the gastric cardia, there appeared to be a possible previous cauterization site, and with washing there was fresh oozing of red blood there. Circumferential APC at stomach settings was applied to this oozing, with cessation of bleeding. 4 hemostatic clips were then placed to prevent bleed. There was mild oozing after this, and then circumferential APC followed by gold probe cautery was used to stop this oozing. There was no bleeding at the end of this. Otherwise, the gastric body, antrum, fundus, cardia, and angularis were normal. No ulcers, erosions or masses visualized.  Endoscope was placed in a retroflexion view in the stomach. There was no evidence of a hiatal hernia.  Duodenum:   The entire examined duodenum was normal.  No old blood or active bleeding was seen.  Impression: hematemesis likely secondary to gastric AVM, s/p endotherapy  Recommendations:  Post EGD precautions, watch for bleeding, infection, perforation, adverse drug reactions   Clear liquid diet, advance as tolerated  Pantoprazole 40mg IV bid while hospitalized, at discharge would recommend 40mg po bid x1 month then daily  Avoid non-aspirin NSAID  Monitor for overt bleeding  Repeat CBC 1/19am  Continue H pylori treatment    Tim Alexandre MD  1/18/2025  11:16 AM

## 2025-01-18 NOTE — PLAN OF CARE
Received pt at shift change. AxOx4. Room air. C/o nausea, zofran given. Vitals stable. Fluids infusing.  See flowsheets for additional assessments.   Pt updated on POC. Call light within reach. All needs met at this time.     Plan:  -LR @ 75 mL/hr  -EGD today - NPO    Problem: Patient/Family Goals  Goal: Patient/Family Long Term Goal  Description: Patient's Long Term Goal: Stay healthy    Interventions:  - Medication management  -Dietary management  -Prompt follow up with physicians  - See additional Care Plan goals for specific interventions  Outcome: Progressing  Goal: Patient/Family Short Term Goal  Description: Patient's Short Term Goal: discharge home    Interventions:   - IVF  -GI to see  - See additional Care Plan goals for specific interventions  Outcome: Progressing     Problem: GASTROINTESTINAL - ADULT  Goal: Minimal or absence of nausea and vomiting  Description: INTERVENTIONS:  - Maintain adequate hydration with IV or PO as ordered and tolerated  - Nasogastric tube to low intermittent suction as ordered  - Evaluate effectiveness of ordered antiemetic medications  - Provide nonpharmacologic comfort measures as appropriate  - Advance diet as tolerated, if ordered  - Obtain nutritional consult as needed  - Evaluate fluid balance  Outcome: Progressing  Goal: Maintains or returns to baseline bowel function  Description: INTERVENTIONS:  - Assess bowel function  - Maintain adequate hydration with IV or PO as ordered and tolerated  - Evaluate effectiveness of GI medications  - Encourage mobilization and activity  - Obtain nutritional consult as needed  - Establish a toileting routine/schedule  - Consider collaborating with pharmacy to review patient's medication profile  Outcome: Progressing  Goal: Maintains adequate nutritional intake (undernourished)  Description: INTERVENTIONS:  - Monitor percentage of each meal consumed  - Identify factors contributing to decreased intake, treat as appropriate  - Assist with  meals as needed  - Monitor I&O, WT and lab values  - Obtain nutritional consult as needed  - Optimize oral hygiene and moisture  - Encourage food from home; allow for food preferences  - Enhance eating environment  Outcome: Progressing  Goal: Achieves appropriate nutritional intake (bariatric)  Description: INTERVENTIONS:  - Monitor for over-consumption  - Identify factors contributing to increased intake, treat as appropriate  - Monitor I&O, WT and lab values  - Obtain nutritional consult as needed  - Evaluate psychosocial factors contributing to over-consumption  Outcome: Progressing

## 2025-01-18 NOTE — ANESTHESIA POSTPROCEDURE EVALUATION
[facility] Patient Status:  Observation   Age/Gender 19 year old female MRN [id number]   Location [facility] ENDOSCOPY [facility]IN [facility] Attending Rodolfo Fernandez MD   Hosp Day # 0 PCP None Pcp       Anesthesia Post-op Note    ESOPHAGOGASTRODUODENOSCOPY (EGD) WITH ARGON PLAMA COAGULAION AND CLIP PLACEMENT X 4 AND GOLD PROBE CAUTERY    Procedure Summary       Date: 01/18/25 Room / Location:  ENDOSCOPY 02 /  ENDOSCOPY    Anesthesia Start: 1051 Anesthesia Stop: 1126    Procedure: ESOPHAGOGASTRODUODENOSCOPY (EGD) WITH ARGON PLAMA COAGULAION AND CLIP PLACEMENT X 4 AND GOLD PROBE CAUTERY Diagnosis:       Nausea      (gasric avm)    Surgeons: Tim Alexandre MD Anesthesiologist: Arcadio Storey MD    Anesthesia Type: MAC ASA Status: 1            Anesthesia Type: MAC    Vitals Value Taken Time   /68 01/18/25 1127   Temp   01/18/25 1132   Pulse 77 01/18/25 1132   Resp 16 01/18/25 1132   SpO2 97 % 01/18/25 1132   Vitals shown include unfiled device data.        Patient Location: Endoscopy    Anesthesia Type: MAC    Airway Patency: patent    Postop Pain Control: adequate    Mental Status: preanesthetic baseline    Nausea/Vomiting: none    Cardiopulmonary/Hydration status: stable euvolemic    Complications: no apparent anesthesia related complications    Postop vital signs: stable    Dental Exam: Unchanged from Preop    Patient to be discharged from PACU when criteria met.

## 2025-01-18 NOTE — PROGRESS NOTES
[facility]   part of [facility]     Hospitalist Progress Note     Ruqjonny Roque Patient Status:  Observation    3/25/2005 MRN [id number]   Location [facility] 0SW-A Attending Rodolfo Fernandez MD   Hosp Day # 0 PCP None Pcp     Chief Complaint: abdominal pain, hematemesis     Subjective:     Patient was seen post EGD.  She is having stomach pain and nausea.  Not improved from day prior.  No other acute complaints.      Objective:    Review of Systems:   A comprehensive review of systems was completed; pertinent positive and negatives stated in subjective.    Vital signs:  Temp:  [98 °F (36.7 °C)-98.7 °F (37.1 °C)] 98 °F (36.7 °C)  Pulse:  [69-92] 69  Resp:  [12-18] 16  BP: ()/(68-76) 99/68  SpO2:  [97 %-100 %] 100 %    Physical Exam:    General: No acute distress, pleasant   Respiratory: No wheezes, no rhonchi  Cardiovascular: S1, S2, regular rate and rhythm  Abdomen: Soft, Non-tender, non-distended, positive bowel sounds  Neuro: No new focal deficits.   Extremities: No edema    Diagnostic Data:    Labs:  Recent Labs   Lab 25  1226 25  1806   WBC 7.5  --    HGB 13.1 11.7*   MCV 79.2*  --    .0  --        Recent Labs   Lab 25  1226   GLU 84   BUN 10   CREATSERUM 0.79   CA 10.0   ALB 4.4      K 3.7      CO2 24.0   ALKPHO 42*   AST 12   ALT 8*   BILT 0.7   TP 7.9       Estimated Glomerular Filtration Rate: 110.7 mL/min/1.73m2 (by CKD-EPI based on SCr of 0.79 mg/dL).    No results for input(s): \"TROP\", \"TROPHS\", \"CK\" in the last 168 hours.    No results for input(s): \"PTP\", \"INR\" in the last 168 hours.               Microbiology    No results found for this visit on 25.      Imaging: Reviewed in Epic.    Medications:    mirtazapine  7.5 mg Oral Nightly    doxycycline  100 mg Oral 2 times per day    metroNIDAZOLE  500 mg Oral BID    pantoprazole  40 mg Intravenous Q12H       Assessment & Plan:      #Gastric AVMs  #intractable abdominal pain  #Hematemesis    S/p EGD with clips placed last month  Hg stable at 13.1 -> 12.2  Xray with normal bowel gas pattern   GI consult  EGD 1/18 required APC to AVM  Protonix, CLD  Trend hg     #H. Pylori  Noted on bx during egd  Resume home tetracycline and flagyl     #Underweight  BMI 17.34  Recent weight loss given abd pain         Rodolfo Fernandez MD    Supplementary Documentation:     Quality:  DVT Mechanical Prophylaxis:   SCDs, Early ambuation  DVT Pharmacologic Prophylaxis   Medication   None                Code Status: Not on file  Reyes: No urinary catheter in place  Reyes Duration (in days):   Central line:    CRUZ:     Discharge is dependent on: Hg in am  At this point Ms. Roque is expected to be discharge to: Home    The 21st Century Cures Act makes medical notes like these available to patients in the interest of transparency. Please be advised this is a medical document. Medical documents are intended to carry relevant information, facts as evident, and the clinical opinion of the practitioner. The medical note is intended as peer to peer communication and may appear blunt or direct. It is written in medical language and may contain abbreviations or verbiage that are unfamiliar.

## 2025-01-18 NOTE — ANESTHESIA PREPROCEDURE EVALUATION
PRE-OP EVALUATION    Patient Name: Norm Roque    Admit Diagnosis: Abdominal pain, epigastric [R10.13]  Gastric AVM [K31.819]  Hematemesis with nausea [K92.0]    Pre-op Diagnosis: Nausea [R11.0]    ESOPHAGOGASTRODUODENOSCOPY (EGD)    Anesthesia Procedure: ESOPHAGOGASTRODUODENOSCOPY (EGD)    Surgeons and Role:     * Tim Alexandre MD - Primary    Pre-op vitals reviewed.  Temp: 98 °F (36.7 °C)  Pulse: 69  Resp: 16  BP: 99/68  SpO2: 100 %  Body mass index is 17.87 kg/m².    Current medications reviewed.  Hospital Medications:   [COMPLETED] sodium chloride 0.9 % IV bolus 1,000 mL  1,000 mL Intravenous Once    [COMPLETED] pantoprazole (Protonix) 40 mg in sodium chloride 0.9% PF 10 mL IV push  40 mg Intravenous Once    mirtazapine (Remeron) tab 7.5 mg  7.5 mg Oral Nightly    doxycycline (Vibramycin) cap 100 mg  100 mg Oral 2 times per day    metroNIDAZOLE (Flagyl) tab 500 mg  500 mg Oral BID    melatonin tab 3 mg  3 mg Oral Nightly PRN    glycerin-hypromellose- (Artificial Tears) 0.2-0.2-1 % ophthalmic solution 1 drop  1 drop Both Eyes QID PRN    sodium chloride (Saline Mist) 0.65 % nasal solution 1 spray  1 spray Each Nare Q3H PRN    lactated ringers infusion   Intravenous Continuous    polyethylene glycol (PEG 3350) (Miralax) 17 g oral packet 17 g  17 g Oral Daily PRN    sennosides (Senokot) tab 17.2 mg  17.2 mg Oral Nightly PRN    bisacodyl (Dulcolax) 10 MG rectal suppository 10 mg  10 mg Rectal Daily PRN    fleet enema (Fleet) rectal enema 133 mL  1 enema Rectal Once PRN    ondansetron (Zofran) 4 MG/2ML injection 4 mg  4 mg Intravenous Q6H PRN    prochlorperazine (Compazine) 10 MG/2ML injection 5 mg  5 mg Intravenous Q8H PRN    morphINE PF 4 MG/ML injection 0.52 mg  0.52 mg Intravenous Q2H PRN    Or    morphINE PF 4 MG/ML injection 1 mg  1 mg Intravenous Q2H PRN    Or    morphINE PF 4 MG/ML injection 2 mg  2 mg Intravenous Q2H PRN    acetaminophen (Tylenol Extra Strength) tab 500 mg  500 mg Oral Q4H PRN     pantoprazole (Protonix) 40 mg in sodium chloride 0.9% PF 10 mL IV push  40 mg Intravenous Q12H       Outpatient Medications:   Prescriptions Prior to Admission[1]    Allergies: Patient has no known allergies.      Anesthesia Evaluation    Patient summary reviewed.    Anesthetic Complications  (-) history of anesthetic complications         GI/Hepatic/Renal      (-) GERD                           Cardiovascular        Exercise tolerance: good     MET: >4    (-) obesity  (-) hypertension     (-) CAD                  (-) angina     (-) CRAWLEY         Endo/Other      (-) diabetes                            Pulmonary      (-) asthma  (-) COPD       (-) shortness of breath     (-) sleep apnea       Neuro/Psych                              Patient Active Problem List:     Abdominal pain, epigastric     Nausea and vomiting in adult     Abdominal pain     AVM (arteriovenous malformation) (HCC)     Gastric AVM     Hematemesis with nausea     Hematemesis            Past Surgical History:   Procedure Laterality Date    Anesth,intestine endoscopy       Social History     Socioeconomic History    Marital status: Single   Tobacco Use    Smoking status: Never    Smokeless tobacco: Never   Vaping Use    Vaping status: Never Used   Substance and Sexual Activity    Alcohol use: Never    Drug use: Never     History   Drug Use Unknown     Available pre-op labs reviewed.  Lab Results   Component Value Date    WBC 7.6 01/18/2025    RBC 4.73 01/18/2025    HGB 12.2 01/18/2025    HCT 38.3 01/18/2025    MCV 81.0 01/18/2025    MCH 25.8 (L) 01/18/2025    MCHC 31.9 01/18/2025    RDW 15.0 01/18/2025    .0 01/18/2025     Lab Results   Component Value Date     01/18/2025    K 3.8 01/18/2025     01/18/2025    CO2 24.0 01/18/2025    BUN 8 (L) 01/18/2025    CREATSERUM 0.76 01/18/2025    GLU 81 01/18/2025    CA 9.1 01/18/2025            Airway      Mallampati: II  Mouth opening: >3 FB  TM distance: 4 - 6 cm  Neck ROM: full  Cardiovascular    Cardiovascular exam normal.  Rhythm: regular  Rate: normal     Dental  Comment: Patient denies any loose/missing/cracked teeth. No gross abnormalities or loose teeth noted on exam.      Dentition appears grossly intact         Pulmonary    Pulmonary exam normal.                 Other findings              ASA: 1   Plan: MAC  NPO status verified and patient meets guidelines.    Post-procedure pain management plan discussed with surgeon and patient.    Comment:     A detailed discussion about the anesthetic plan was held with Norm Roque in the preoperative area. Benefits and risks of MAC anesthesia were discussed, including intraoperative awareness/recall, PONV, reasonable expectations of post-operative pain/discomfort, aspiration, conversion to general anesthesia, dental injury, pressure/nerve injuries from positioning, and other serious but rare complications (life-threatening cardiopulmonary events). All questions were answered appropriately and patient demonstrated understanding of realistic expectations and risks of undergoing anesthesia. Norm Roque consents to receiving anesthesia and wishes to proceed.  Plan/risks discussed with: patient                Present on Admission:  **None**             [1]   Medications Prior to Admission   Medication Sig Dispense Refill Last Dose/Taking    metroNIDAZOLE 500 MG Oral Tab Take 1 tablet (500 mg total) by mouth 3 (three) times daily for 14 days. (Patient taking differently: Take 1 tablet (500 mg total) by mouth 2 (two) times daily.) 42 tablet 0 1/13/2025    Tetracycline HCl 500 MG Oral Cap Take 1 capsule (500 mg total) by mouth 4 (four) times daily for 14 days. (Patient taking differently: Take 1 capsule (500 mg total) by mouth 2 (two) times daily.) 56 capsule 0 1/13/2025    pantoprazole 40 MG Oral Tab EC Take 1 tablet (40 mg total) by mouth 2 (two) times daily before meals. For 4 weeks, then take once tablet once daily. 90 tablet 3  1/16/2025 at 11:00 AM    mirtazapine 7.5 MG Oral Tab Take 1 tablet (7.5 mg total) by mouth nightly.   1/16/2025

## 2025-01-18 NOTE — PROGRESS NOTES
Patient is alert and oriented x4. Vital signs stable on room air. Denies nausea and pain at this time. Ambulatory to void. NPO since midnight, otherwise was tolerating soft diet. IVF per order. PO abx. Plan of care discussed with patient and call light in reach.

## 2025-01-19 ENCOUNTER — APPOINTMENT (OUTPATIENT)
Dept: CT IMAGING | Facility: HOSPITAL | Age: 20
End: 2025-01-19
Attending: INTERNAL MEDICINE
Payer: MEDICAID

## 2025-01-19 LAB
ALBUMIN SERPL-MCNC: 3.9 G/DL (ref 3.2–4.8)
ALBUMIN/GLOB SERPL: 1.4 {RATIO} (ref 1–2)
ALP LIVER SERPL-CCNC: 38 U/L
ALT SERPL-CCNC: <7 U/L
ANION GAP SERPL CALC-SCNC: 8 MMOL/L (ref 0–18)
AST SERPL-CCNC: 12 U/L (ref ?–34)
BILIRUB SERPL-MCNC: 0.3 MG/DL (ref 0.3–1.2)
BUN BLD-MCNC: 7 MG/DL (ref 9–23)
CALCIUM BLD-MCNC: 9.2 MG/DL (ref 8.7–10.6)
CHLORIDE SERPL-SCNC: 108 MMOL/L (ref 98–112)
CO2 SERPL-SCNC: 24 MMOL/L (ref 21–32)
CREAT BLD-MCNC: 0.85 MG/DL
EGFRCR SERPLBLD CKD-EPI 2021: 101 ML/MIN/1.73M2 (ref 60–?)
ERYTHROCYTE [DISTWIDTH] IN BLOOD BY AUTOMATED COUNT: 15.4 %
GLOBULIN PLAS-MCNC: 2.7 G/DL (ref 2–3.5)
GLUCOSE BLD-MCNC: 103 MG/DL (ref 70–99)
HCT VFR BLD AUTO: 39.6 %
HGB BLD-MCNC: 13 G/DL
IGA SERPL-MCNC: 96.4 MG/DL (ref 40–350)
LIPASE SERPL-CCNC: 57 U/L (ref 12–53)
MCH RBC QN AUTO: 26.6 PG (ref 26–34)
MCHC RBC AUTO-ENTMCNC: 32.8 G/DL (ref 31–37)
MCV RBC AUTO: 81 FL
OSMOLALITY SERPL CALC.SUM OF ELEC: 288 MOSM/KG (ref 275–295)
PLATELET # BLD AUTO: 254 10(3)UL (ref 150–450)
POTASSIUM SERPL-SCNC: 3.4 MMOL/L (ref 3.5–5.1)
PROT SERPL-MCNC: 6.6 G/DL (ref 5.7–8.2)
RBC # BLD AUTO: 4.89 X10(6)UL
SODIUM SERPL-SCNC: 140 MMOL/L (ref 136–145)
TSI SER-ACNC: 1.14 UIU/ML (ref 0.48–4.17)
WBC # BLD AUTO: 8.8 X10(3) UL (ref 4–11)

## 2025-01-19 PROCEDURE — 99232 SBSQ HOSP IP/OBS MODERATE 35: CPT | Performed by: HOSPITALIST

## 2025-01-19 PROCEDURE — 74177 CT ABD & PELVIS W/CONTRAST: CPT | Performed by: INTERNAL MEDICINE

## 2025-01-19 RX ORDER — POTASSIUM CHLORIDE 1.5 G/1.58G
40 POWDER, FOR SOLUTION ORAL ONCE
Status: DISCONTINUED | OUTPATIENT
Start: 2025-01-19 | End: 2025-01-20

## 2025-01-19 NOTE — PLAN OF CARE
Patient alert and oriented; VSS; no telemetry; lung sounds clear, on room air, no cough noted; no edema; continent of bowel and bladder with LBM 1/16; patient ambulates independently;  patient with nausea this morning, zofran given as ordered;     1130: Patient was hyperventilating, dry heaves but spit up small amount of clear liquid-compazine given as ordered, instructed on deep breathing. Patient complained of pain, 8/10, to upper abdomen-morphine administered as ordered. Dr Alexandre present, updated, and received order to change diet to clear liquids.     1500: Patient has completed drinking her contrast for the CT.    Problem: Patient/Family Goals  Goal: Patient/Family Long Term Goal  Description: Patient's Long Term Goal: Stay healthy    Interventions:  - Medication management  -Dietary management  -Prompt follow up with physicians  - See additional Care Plan goals for specific interventions  Outcome: Progressing  Goal: Patient/Family Short Term Goal  Description: Patient's Short Term Goal: discharge home    Interventions:   - IVF  -GI to see  - See additional Care Plan goals for specific interventions  Outcome: Progressing     Problem: GASTROINTESTINAL - ADULT  Goal: Minimal or absence of nausea and vomiting  Description: INTERVENTIONS:  - Maintain adequate hydration with IV or PO as ordered and tolerated  - Nasogastric tube to low intermittent suction as ordered  - Evaluate effectiveness of ordered antiemetic medications  - Provide nonpharmacologic comfort measures as appropriate  - Advance diet as tolerated, if ordered  - Obtain nutritional consult as needed  - Evaluate fluid balance  Outcome: Progressing  Goal: Maintains or returns to baseline bowel function  Description: INTERVENTIONS:  - Assess bowel function  - Maintain adequate hydration with IV or PO as ordered and tolerated  - Evaluate effectiveness of GI medications  - Encourage mobilization and activity  - Obtain nutritional consult as needed  -  Establish a toileting routine/schedule  - Consider collaborating with pharmacy to review patient's medication profile  Outcome: Progressing  Goal: Maintains adequate nutritional intake (undernourished)  Description: INTERVENTIONS:  - Monitor percentage of each meal consumed  - Identify factors contributing to decreased intake, treat as appropriate  - Assist with meals as needed  - Monitor I&O, WT and lab values  - Obtain nutritional consult as needed  - Optimize oral hygiene and moisture  - Encourage food from home; allow for food preferences  - Enhance eating environment  Outcome: Progressing  Goal: Achieves appropriate nutritional intake (bariatric)  Description: INTERVENTIONS:  - Monitor for over-consumption  - Identify factors contributing to increased intake, treat as appropriate  - Monitor I&O, WT and lab values  - Obtain nutritional consult as needed  - Evaluate psychosocial factors contributing to over-consumption  Outcome: Progressing

## 2025-01-19 NOTE — PROGRESS NOTES
Inpatient Follow up Note    Norm Roque Patient Status:  Observation    3/25/2005 MRN [id number]   Location [facility] 0SW-A Attending Rodolfo Fernandez MD   Hosp Day # 0 PCP None Pcp     Reason for Consultation   hematemesis     Subjective   Patient continues to have epigastric pain. Tolerated solid food yesterday, but today with nausea, and spitting up clear spit. No rectal bleeding.             Objective:     /64 (BP Location: Right arm)   Pulse 80   Temp 98 °F (36.7 °C) (Oral)   Resp 20   Ht 5' 2\" (1.575 m)   Wt 97 lb 11.2 oz (44.3 kg)   LMP 2024   SpO2 98%   BMI 17.87 kg/m²   Gen: AAOx3  CV: RRR with normal S1 / S2  Resp: CTA bilaterally  Abd: (+)BS, soft, epigastric tenderness, non-distended; no rebound or guarding  Ext: No edema or cyanosis  Skin: Warm and dry     Labs/Imaging     LABRCNTIP[PGLU:5@  No results for input(s): \"INR\" in the last 168 hours.      Recent Labs   Lab 25  1226 25  1806 25  0701 25  0722   WBC 7.5  --  7.6 8.8   HGB 13.1 11.7* 12.2 13.0   .0  --  230.0 254.0       Recent Labs   Lab 25  1226 25  0701 25  1154    141 140   K 3.7 3.8 3.4*    109 108   CO2 24.0 24.0 24.0   BUN 10 8* 7*   CREATSERUM 0.79 0.76 0.85       Recent Labs   Lab 25  1226 25  1154   ALT 8* <7*   AST 12 12     XR ABDOMEN OBSTRUCTIVE SERIES ROUTINE(2 VW)(CPT=74019)    Result Date: 2025  CONCLUSION:  Bowel gas pattern is unremarkable.   LOCATION:  [place] [id number]    Dictated by (CST): Bakari Stover MD on 2025 at 1:22 PM     Finalized by (CST): Bakari Stover MD on 2025 at 1:23 PM      AST   Date/Time Value Ref Range Status   2025 11:54 AM 12 <34 U/L Final     ALT   Date/Time Value Ref Range Status   2025 11:54 AM <7 (L) 10 - 49 U/L Final     BUN   Date/Time Value Ref Range Status   2025 11:54 AM 7 (L) 9 - 23 mg/dL Final      EGD: Findings and Therapeutics:  Esophagus:    The mucosa was normal.   There were no strictures or stenosis. GEJ junction traversed with endoscope without resistance.  The Z-line was irregular, appreciated at 35 cm from the incisors.       Stomach:    The was a clip seen in the gastric body and a clip seen in the antrum, with some surrounding granulation tissue at each site, but no bleeding or AVMs noted. In the gastric cardia, there appeared to be a possible previous cauterization site, and with washing there was fresh oozing of red blood there. Circumferential APC at stomach settings was applied to this oozing, with cessation of bleeding. 4 hemostatic clips were then placed to prevent bleed. There was mild oozing after this, and then circumferential APC followed by gold probe cautery was used to stop this oozing. There was no bleeding at the end of this. Otherwise, the gastric body, antrum, fundus, cardia, and angularis were normal. No ulcers, erosions or masses visualized. Endoscope was placed in a retroflexion view in the stomach. There was no evidence of a hiatal hernia.  Duodenum:   The entire examined duodenum was normal.  No old blood or active bleeding was seen         Assessment     [facility]  GASTROENTEROLOGY NEW CONSULT NOTE   [facility]            Norm Roque Patient Status:  Observation    3/25/2005 MRN [id number]   [facility] 0SW-A Attending Rodolfo Fernandez MD   Hosp Day # 0 PCP None Pcp         Reason for Consultation:   Hematemesis, abdominal pain     History of Present Illness (HPI):  Norm oRque is a 19 year old female with chronic medical conditions including H. 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  Patient was recently admitted in 2024 for hematemesis and at that time was found to have 2 AVMs in the stomach which  were treated using APC and Endo Clip.  H. pylori stool antigen was performed which was positive and she was started on antibiotics.  She notes she was able to take the antibiotics as prescribed for 1 week, but due to significant pain she has only been now taking this twice a day rather than 4 times per day.  She has 1 to 2 days left of the treatment.  She notes pain occurs every day and she has had nausea and emesis every day.  She has been throwing up blood on a daily basis since the procedure.  She notes the blood is bright red in color.  Hemoglobin on admission was within normal limits at 13.1.  Lipase minimally elevated at 65.  CMP was unremarkable but did show low alkaline phosphatase level.  Negative pregnancy test.  Abdominal x-ray on admission was unremarkable.  She notes she has lost about 3 kg since December due to being unable to eat.     Past Medical History:  Past Medical History       Past Medical History:    Gastric AVM    Upper GI bleed            Past Surgical History:  Past Surgical History         Past Surgical History:   Procedure Laterality Date    Anesth,intestine endoscopy                Family History:  No first-degree relative with a history of colorectal cancer.     Social History:  No IVDU        Medications:   Current Medications    [COMPLETED] sodium chloride 0.9 % IV bolus 1,000 mL  1,000 mL Intravenous Once    [COMPLETED] pantoprazole (Protonix) 40 mg in sodium chloride 0.9% PF 10 mL IV push  40 mg Intravenous Once    mirtazapine (Remeron) tab 7.5 mg  7.5 mg Oral Nightly    doxycycline (Vibramycin) cap 100 mg  100 mg Oral 2 times per day    metroNIDAZOLE (Flagyl) tab 500 mg  500 mg Oral BID    melatonin tab 3 mg  3 mg Oral Nightly PRN    glycerin-hypromellose- (Artificial Tears) 0.2-0.2-1 % ophthalmic solution 1 drop  1 drop Both Eyes QID PRN    sodium chloride (Saline Mist) 0.65 % nasal solution 1 spray  1 spray Each Nare Q3H PRN    lactated ringers infusion   Intravenous  Continuous    polyethylene glycol (PEG 3350) (Miralax) 17 g oral packet 17 g  17 g Oral Daily PRN    sennosides (Senokot) tab 17.2 mg  17.2 mg Oral Nightly PRN    bisacodyl (Dulcolax) 10 MG rectal suppository 10 mg  10 mg Rectal Daily PRN    fleet enema (Fleet) rectal enema 133 mL  1 enema Rectal Once PRN    ondansetron (Zofran) 4 MG/2ML injection 4 mg  4 mg Intravenous Q6H PRN    prochlorperazine (Compazine) 10 MG/2ML injection 5 mg  5 mg Intravenous Q8H PRN    morphINE PF 4 MG/ML injection 0.52 mg  0.52 mg Intravenous Q2H PRN     Or    morphINE PF 4 MG/ML injection 1 mg  1 mg Intravenous Q2H PRN     Or    morphINE PF 4 MG/ML injection 2 mg  2 mg Intravenous Q2H PRN    acetaminophen (Tylenol Extra Strength) tab 500 mg  500 mg Oral Q4H PRN    pantoprazole (Protonix) 40 mg in sodium chloride 0.9% PF 10 mL IV push  40 mg Intravenous Q12H            Allergies:  [Allergies]    [Allergies]  No Known Allergies     Review of Systems  Negative unless otherwise mentioned in HPI.      Physical Exam  /68   Pulse 92   Temp 98.3 °F (36.8 °C) (Oral)   Resp 18   Ht 5' 2\" (1.575 m)   Wt 97 lb 11.2 oz (44.3 kg)   LMP 12/28/2024   SpO2 100%   BMI 17.87 kg/m²   Body mass index is 17.87 kg/m².        Gen: Awake and alert, NAD  Eyes:  no scleral icterus  Cardiovascular: Warm, well-perfused  Respiratory: No respiratory distress  Abd: Soft, mild tenderness palpation epigastric region, non-distended. No rebound tenderness, no guarding.  Neuro:  Alert and oriented to name, location and time.      Diagnostic Data:       Labs:      Recent Labs   Lab 01/17/25  1226   WBC 7.5   HGB 13.1   MCV 79.2*   .0             Recent Labs   Lab 01/17/25  1226   GLU 84   BUN 10   CREATSERUM 0.79   CA 10.0   ALB 4.4      K 3.7      CO2 24.0   ALKPHO 42*   AST 12   ALT 8*   BILT 0.7   TP 7.9         No results for input(s): \"PTP\", \"INR\" in the last 168 hours.           Imaging: Imaging data reviewed in Epic.     Medications:    Scheduled Medications    mirtazapine  7.5 mg Oral Nightly    doxycycline  100 mg Oral 2 times per day    metroNIDAZOLE  500 mg Oral BID    pantoprazole  40 mg Intravenous Q12H            Allergies:  [Allergies]    [Allergies]  No Known Allergies     Imaging:  I have personally reviewed all pertinent available imaging.        ASSESSMENT:      Norm Roque is a 19 year old female with chronic medical conditions including H. pylori currently on treatment and recent gastric AVMs status post APC and clipping on December 21, 2024 that presented with persistent epigastric abdominal discomfort, nausea, and hematemesis. She underwent an EGD on 1/18 that showed an oozing AVM that was again treated with endotherapy, with hemostasis achieved. Her epigastric pain and nausea have continued. Her AVM would not cause these symptoms. Ddx includes pancreatitis, celiac, thyroid disease, FD, intra-abdominal infection, gastroparesis.        Plan   -clear liquid diet  -pain control, anti-emetics per primary  -TSH, celiac studies, lipase  -CT A&P with contrast  -consider gastric emptying study if not improving  -continue PPI BID  -continue H pylori treatment  -avoid NSAIDs       Tim Alexandre MD  2:51 PM  1/19/2025  [facility]  [phone]

## 2025-01-19 NOTE — PROGRESS NOTES
[facility]   part of [facility]     Hospitalist Progress Note     Florencioqjonny Roque Patient Status:  Observation    3/25/2005 MRN [id number]   Location [facility] 0SW-A Attending Rodolfo Fernandez MD   Hosp Day # 0 PCP None Pcp     Chief Complaint: abdominal pain, hematemesis     Subjective:     Patient continues to have abdominal pain and nausea.  Denies fevers, chills.  No other complaints.      Objective:    Review of Systems:   A comprehensive review of systems was completed; pertinent positive and negatives stated in subjective.    Vital signs:  Temp:  [98 °F (36.7 °C)-98.9 °F (37.2 °C)] 98 °F (36.7 °C)  Pulse:  [64-80] 80  Resp:  [20] 20  BP: (100-111)/(51-68) 101/64  SpO2:  [94 %-98 %] 98 %    Physical Exam:    General: No acute distress, pleasant   Respiratory: No wheezes, no rhonchi  Cardiovascular: S1, S2, regular rate and rhythm  Abdomen: Soft, Non-tender, non-distended, positive bowel sounds  Neuro: No new focal deficits.   Extremities: No edema    Diagnostic Data:    Labs:  Recent Labs   Lab 25  1226 25  1806 25  0701 25  0722   WBC 7.5  --  7.6 8.8   HGB 13.1 11.7* 12.2 13.0   MCV 79.2*  --  81.0 81.0   .0  --  230.0 254.0       Recent Labs   Lab 25  1226 25  0701 25  1154   GLU 84 81 103*   BUN 10 8* 7*   CREATSERUM 0.79 0.76 0.85   CA 10.0 9.1 9.2   ALB 4.4  --  3.9    141 140   K 3.7 3.8 3.4*    109 108   CO2 24.0 24.0 24.0   ALKPHO 42*  --  38*   AST 12  --  12   ALT 8*  --  <7*   BILT 0.7  --  0.3   TP 7.9  --  6.6       Estimated Glomerular Filtration Rate: 101.4 mL/min/1.73m2 (by CKD-EPI based on SCr of 0.85 mg/dL).    No results for input(s): \"TROP\", \"TROPHS\", \"CK\" in the last 168 hours.    No results for input(s): \"PTP\", \"INR\" in the last 168 hours.    Lab Results   Component Value Date    TSH 1.141 2025             Microbiology    No results found for this visit on 25.      Imaging: Reviewed in  Epic.    Medications:    potassium chloride  40 mEq Oral Once    mirtazapine  7.5 mg Oral Nightly    doxycycline  100 mg Oral 2 times per day    metroNIDAZOLE  500 mg Oral BID    pantoprazole  40 mg Intravenous Q12H       Assessment & Plan:      #Gastric AVMs  #intractable abdominal pain  #Hematemesis   S/p EGD with clips placed last month  Hg stable   Xray with normal bowel gas pattern   GI consult  EGD 1/18 required APC to AVM  Protonix BID, CLD - adat  CT a/p with contrast ordered  May benefit from GES  Trend hg     #H. Pylori  Noted on bx during egd  Resume home tetracycline and flagyl     #Underweight  BMI 17.34  Recent weight loss given abd pain         Rodolfo Fernandez MD    Supplementary Documentation:     Quality:  DVT Mechanical Prophylaxis:   SCDs, Early ambuation  DVT Pharmacologic Prophylaxis   Medication   None                Code Status: Not on file  Reyes: No urinary catheter in place  Reyes Duration (in days):   Central line:    CURZ: 1/19/2025    Discharge is dependent on: Hg in am  At this point Ms. Roque is expected to be discharge to: Home    The 21st Century Cures Act makes medical notes like these available to patients in the interest of transparency. Please be advised this is a medical document. Medical documents are intended to carry relevant information, facts as evident, and the clinical opinion of the practitioner. The medical note is intended as peer to peer communication and may appear blunt or direct. It is written in medical language and may contain abbreviations or verbiage that are unfamiliar.

## 2025-01-19 NOTE — PLAN OF CARE
Assumed care of pt at 1930, pt c/o pain to epigastric region 6/10. Pt states that morphine did not help pain, md notified and norco ordered per pt norco worked well. Iv sl. Tolerating soft diet denies nausea. No emesis noted. Plan for CBC in am. All questions and concerns noted.

## 2025-01-20 VITALS
WEIGHT: 97.69 LBS | DIASTOLIC BLOOD PRESSURE: 61 MMHG | SYSTOLIC BLOOD PRESSURE: 105 MMHG | BODY MASS INDEX: 17.98 KG/M2 | HEIGHT: 62 IN | HEART RATE: 85 BPM | RESPIRATION RATE: 18 BRPM | TEMPERATURE: 98 F | OXYGEN SATURATION: 100 %

## 2025-01-20 LAB
POTASSIUM SERPL-SCNC: 3.8 MMOL/L (ref 3.5–5.1)
TTG IGA SER-ACNC: <0.2 U/ML (ref ?–7)

## 2025-01-20 PROCEDURE — 99239 HOSP IP/OBS DSCHRG MGMT >30: CPT | Performed by: HOSPITALIST

## 2025-01-20 RX ORDER — METOCLOPRAMIDE HYDROCHLORIDE 5 MG/ML
10 INJECTION INTRAMUSCULAR; INTRAVENOUS EVERY 6 HOURS PRN
Status: DISCONTINUED | OUTPATIENT
Start: 2025-01-20 | End: 2025-01-20

## 2025-01-20 RX ORDER — PANTOPRAZOLE SODIUM 40 MG/1
40 TABLET, DELAYED RELEASE ORAL
Qty: 90 TABLET | Refills: 3 | Status: SHIPPED | OUTPATIENT
Start: 2025-01-20

## 2025-01-20 RX ORDER — PANTOPRAZOLE SODIUM 40 MG/1
40 TABLET, DELAYED RELEASE ORAL
Qty: 90 TABLET | Refills: 3 | Status: SHIPPED | OUTPATIENT
Start: 2025-01-20 | End: 2025-01-20

## 2025-01-20 RX ORDER — METOCLOPRAMIDE 10 MG/1
10 TABLET ORAL 3 TIMES DAILY PRN
Qty: 30 TABLET | Refills: 0 | Status: SHIPPED | OUTPATIENT
Start: 2025-01-20

## 2025-01-20 RX ORDER — HYDROCODONE BITARTRATE AND ACETAMINOPHEN 5; 325 MG/1; MG/1
1-2 TABLET ORAL EVERY 8 HOURS PRN
Qty: 15 TABLET | Refills: 0 | Status: SHIPPED | OUTPATIENT
Start: 2025-01-20

## 2025-01-20 NOTE — DISCHARGE SUMMARY
[facility]     Norm Roque Patient Status:  Observation    3/25/2005 MRN [id number]   Location [facility] 0SW-A Attending No att. providers found   Hosp Day # 0 PCP None Pcp     Date of Admission: 2025  Date of Discharge:  2025     Discharge Disposition: Home or Self Care    Discharge Diagnosis:    #Gastric AVMs  #intractable abdominal pain  #Hematemesis   #Chronic abdominal pain  #H. Pylori  #Underweight    History of Present Illness: Norm Roque is a 19 year old female with a past medical history of gastric AVMs who presented to the emergency department with abdominal pain and hematemesis.  Patient was recently admitted and had an EGD which showed multiple AVMs which were banded.  Says that she has had follow-up appointments with GI and genetic analysis performed.  She was found to have H. pylori and is being treated with oral antibiotics.  Patient began having worsening abdominal pain over the last several days, began vomiting this morning.  She noted radha blood in her vomit.  Denied having any blood clots.  No bloody stools.  No recent fevers, chills, shortness of breath, chest pain, no other acute complaints at this time.    Brief Synopsis:   Patient with recurrent abdominal pain who admitted with hematemesis.  She underwent an EGD which showed AVM which was treated.  Hemoglobin was stable during this hospitalization.  She was cleared by GI for discharge home.  She continued Protonix.  Patient also being treated with Reglan for continued nausea and pain meds.    Patient may have underlying anxiety as well, she refused a psychiatric eval or any treatment.    Patient to follow-up closely with her PCP as well as GI team.  All question concerns addressed with patient and family prior to discharge.  Patient agreeable to plan of care as stated.  She was encouraged return to the ER if symptoms come back or worseng.      Lace+ Score: 20  59-90 High  Risk  29-58 Medium Risk  0-28   Low Risk       TCM Follow-Up Recommendation:  LACE < 29: Low Risk of readmission after discharge from the hospital. No TCM follow-up needed.      Procedures during hospitalization:   EGD 1/18    Consultants:  GI    Discharge Medication List:     Discharge Medications        START taking these medications        Instructions Prescription details   HYDROcodone-acetaminophen 5-325 MG Tabs  Commonly known as: Norco      Take 1-2 tablets by mouth every 8 (eight) hours as needed for Pain.   Quantity: 15 tablet  Refills: 0     metoclopramide 10 MG Tabs  Commonly known as: Reglan      Take 1 tablet (10 mg total) by mouth 3 (three) times daily as needed.   Quantity: 30 tablet  Refills: 0            CONTINUE taking these medications        Instructions Prescription details   metroNIDAZOLE 500 MG Tabs  Commonly known as: Flagyl      Take 1 tablet (500 mg total) by mouth 3 (three) times daily for 14 days.   Quantity: 42 tablet  Refills: 0     mirtazapine 7.5 MG Tabs  Commonly known as: Remeron      Take 1 tablet (7.5 mg total) by mouth nightly.   Refills: 0     pantoprazole 40 MG Tbec  Commonly known as: Protonix      Take 1 tablet (40 mg total) by mouth 2 (two) times daily before meals. For 4 weeks, then take once tablet once daily.   Quantity: 90 tablet  Refills: 3     Tetracycline HCl 500 MG Caps      Take 1 capsule (500 mg total) by mouth 4 (four) times daily for 14 days.   Quantity: 56 capsule  Refills: 0               Where to Get Your Medications        These medications were sent to [id number] - [place], [place] - [address] [facility] [address] [facility], [phone], [phone] [address], [facility] [address]      Phone: [phone]   HYDROcodone-acetaminophen 5-325 MG Tabs  metoclopramide 10 MG Tabs  pantoprazole 40 MG Tbec         [place]    Follow-up appointment:   Sanford Pollock MD  [address]  [id number]  [phone]    Go in 3 week(s)  for a follow-up office visit with GI. The office will call to arrange date/time of visit    Pcp, None  [place] [address]    Follow up in 1 week(s)      Appointments for Next 30 Days 2025 - 2025      None            Vital signs:  Temp:  [97.8 °F (36.6 °C)-98.1 °F (36.7 °C)] 98 °F (36.7 °C)  Pulse:  [] 85  Resp:  [16-18] 18  BP: ()/(54-67) 105/61  SpO2:  [100 %] 100 %    Physical Exam:    General: No acute distress, pleasant   Respiratory: No wheezes, no rhonchi  Cardiovascular: S1, S2, regular rate and rhythm  Abdomen: Soft, Non-tender, non-distended, positive bowel sounds  Neuro: No new focal deficits.   Extremities: No edema    -----------------------------------------------------------------------------------------------  PATIENT DISCHARGE INSTRUCTIONS: See electronic chart    Rodolfo Fernandez MD    Total time spent on discharge plannin minutes     The  [employer] C[employer] Act makes medical notes like these available to patients in the interest of transparency. Please be advised this is a medical document. Medical documents are intended to carry relevant information, facts as evident, and the clinical opinion of the practitioner. The medical note is intended as peer to peer communication and may appear blunt or direct. It is written in medical language and may contain abbreviations or verbiage that are unfamiliar.

## 2025-01-20 NOTE — PLAN OF CARE
Assumed care of pt at 1930, pt has been sleeping since change of shift only waking up for meds. Pt denies nausea or pain at that time. Pt did not sleep the night before. Iv sl. No active bleeding noted. Sister at bedside. Pt on soft diet. POC discussed with pt and sister both aware she needs to tolerate her diet to be dc'ed today.

## 2025-01-20 NOTE — PLAN OF CARE
Assumed patient care at 0730.  Vital signs stable.  Patient alert and oriented x 4.  Patient denies pain, however did have some nausea and vomited up bile with a small pink tinged spot.  Antiemetics given and patient tolerating food.  Discharge orders received. IV discontinued.  Discharge instructions reviewed with the patient and she verbalized understanding.  Patient discharged home.     Problem: Patient/Family Goals  Goal: Patient/Family Long Term Goal  Description: Patient's Long Term Goal: Stay healthy    Interventions:  - Medication management  -Dietary management  -Prompt follow up with physicians  - See additional Care Plan goals for specific interventions  Outcome: Adequate for Discharge  Goal: Patient/Family Short Term Goal  Description: Patient's Short Term Goal: discharge home    Interventions:   - IVF  -GI to see  - See additional Care Plan goals for specific interventions  Outcome: Adequate for Discharge     Problem: GASTROINTESTINAL - ADULT  Goal: Minimal or absence of nausea and vomiting  Description: INTERVENTIONS:  - Maintain adequate hydration with IV or PO as ordered and tolerated  - Nasogastric tube to low intermittent suction as ordered  - Evaluate effectiveness of ordered antiemetic medications  - Provide nonpharmacologic comfort measures as appropriate  - Advance diet as tolerated, if ordered  - Obtain nutritional consult as needed  - Evaluate fluid balance  Outcome: Adequate for Discharge  Goal: Maintains or returns to baseline bowel function  Description: INTERVENTIONS:  - Assess bowel function  - Maintain adequate hydration with IV or PO as ordered and tolerated  - Evaluate effectiveness of GI medications  - Encourage mobilization and activity  - Obtain nutritional consult as needed  - Establish a toileting routine/schedule  - Consider collaborating with pharmacy to review patient's medication profile  Outcome: Adequate for Discharge  Goal: Maintains adequate nutritional intake  (undernourished)  Description: INTERVENTIONS:  - Monitor percentage of each meal consumed  - Identify factors contributing to decreased intake, treat as appropriate  - Assist with meals as needed  - Monitor I&O, WT and lab values  - Obtain nutritional consult as needed  - Optimize oral hygiene and moisture  - Encourage food from home; allow for food preferences  - Enhance eating environment  Outcome: Adequate for Discharge  Goal: Achieves appropriate nutritional intake (bariatric)  Description: INTERVENTIONS:  - Monitor for over-consumption  - Identify factors contributing to increased intake, treat as appropriate  - Monitor I&O, WT and lab values  - Obtain nutritional consult as needed  - Evaluate psychosocial factors contributing to over-consumption  Outcome: Adequate for Discharge

## 2025-01-20 NOTE — PROGRESS NOTES
Gastroenterology Progress Note  Patient Name: Norm Roque  Chief Complaint: Abdominal pain  S: The patient reports continued epigastric abdominal pain.  She is not eating, due to her complaint of the pain.  No vomiting, but (+)nausea.  O: /65 (BP Location: Left arm)   Pulse 75   Temp 98.1 °F (36.7 °C) (Oral)   Resp 16   Ht 5' 2\" (1.575 m)   Wt 97 lb 11.2 oz (44.3 kg)   LMP 12/28/2024   SpO2 100%   BMI 17.87 kg/m²   Gen: AAOx3   Abd: (+)BS, soft, (+)tender in the epigastrium to moderate palpation; non-distended; no rebound or guarding  Ext: No edema or cyanosis  Skin: Warm and dry  Psych: Flat affect and congruent mood  Laboratory Data:   Lab Results   Component Value Date    CREATSERUM 0.85 01/19/2025    BUN 7 01/19/2025     01/19/2025    K 3.8 01/20/2025     01/19/2025    CO2 24.0 01/19/2025     01/19/2025    CA 9.2 01/19/2025    ALB 3.9 01/19/2025    ALKPHO 38 01/19/2025    BILT 0.3 01/19/2025    AST 12 01/19/2025    ALT <7 01/19/2025    LIP 57 01/19/2025     Recent Labs   Lab 01/17/25  1226 01/18/25  0701 01/19/25  1154 01/20/25  0546   GLU 84 81 103*  --    BUN 10 8* 7*  --    CREATSERUM 0.79 0.76 0.85  --    CA 10.0 9.1 9.2  --     141 140  --    K 3.7 3.8 3.4* 3.8    109 108  --    CO2 24.0 24.0 24.0  --      Recent Labs   Lab 01/18/25  0701 01/19/25  0722   RBC 4.73 4.89   HGB 12.2 13.0   HCT 38.3 39.6   MCV 81.0 81.0   MCH 25.8* 26.6   MCHC 31.9 32.8   RDW 15.0 15.4   NEPRELIM 3.62  --    WBC 7.6 8.8   .0 254.0       Recent Labs   Lab 01/17/25  1226 01/19/25  1154   ALT 8* <7*   AST 12 12       Assessment: This is a 18 yo woman who had presented with epigastric abdominal pain and with evidence of mild recurrent bleeding at site of recent AVM treatment.  She is being treated for H.pylori, though has not been able to tolerate the full dosing.  She will be completing the course within the next day.  Her hgb is stable.  I do suspect that there is a  component of functional pain to her symptom complex.  Plan:   1) Continue Pantoprazole 40 mg bid x 8-12 weeks  2) complete H.pylori therapy  3) H.pylori stool antigen in 4-6 weeks - will need to hold ppi x 2 weeks.  So, would probably wait 8 weeks, then stop x 2 weeks and do testing then.   4) Discharge home today  5) We will sign-off, but will arrange OP follow-up in 2-4 weeks.

## 2025-02-07 ENCOUNTER — LAB SERVICES (OUTPATIENT)
Dept: LAB | Age: 20
End: 2025-02-07

## 2025-02-07 ENCOUNTER — OFFICE VISIT (OUTPATIENT)
Dept: FAMILY MEDICINE | Age: 20
End: 2025-02-07

## 2025-02-07 VITALS
DIASTOLIC BLOOD PRESSURE: 58 MMHG | OXYGEN SATURATION: 99 % | HEIGHT: 62 IN | TEMPERATURE: 98.1 F | HEART RATE: 92 BPM | RESPIRATION RATE: 20 BRPM | WEIGHT: 95.02 LBS | BODY MASS INDEX: 17.49 KG/M2 | SYSTOLIC BLOOD PRESSURE: 92 MMHG

## 2025-02-07 DIAGNOSIS — R10.13 EPIGASTRIC PAIN: Primary | ICD-10-CM

## 2025-02-07 DIAGNOSIS — K92.2 UPPER GI BLEED: ICD-10-CM

## 2025-02-07 DIAGNOSIS — K55.20: ICD-10-CM

## 2025-02-07 DIAGNOSIS — R10.13 EPIGASTRIC PAIN: ICD-10-CM

## 2025-02-07 DIAGNOSIS — A04.8 H. PYLORI INFECTION: ICD-10-CM

## 2025-02-07 LAB
BASOPHILS # BLD: 0.1 K/MCL (ref 0–0.3)
BASOPHILS NFR BLD: 1 %
DEPRECATED RDW RBC: 45 FL (ref 39–50)
EOSINOPHIL # BLD: 0.3 K/MCL (ref 0–0.5)
EOSINOPHIL NFR BLD: 3 %
ERYTHROCYTE [DISTWIDTH] IN BLOOD: 15 % (ref 11–15)
HCT VFR BLD CALC: 42.1 % (ref 36–46.5)
HGB BLD-MCNC: 13 G/DL (ref 12–15.5)
IMM GRANULOCYTES # BLD AUTO: 0 K/MCL (ref 0–0.2)
IMM GRANULOCYTES # BLD: 0 %
LYMPHOCYTES # BLD: 2.6 K/MCL (ref 1.2–5.2)
LYMPHOCYTES NFR BLD: 31 %
MCH RBC QN AUTO: 25.4 PG (ref 26–34)
MCHC RBC AUTO-ENTMCNC: 30.9 G/DL (ref 32–36.5)
MCV RBC AUTO: 82.4 FL (ref 78–100)
MONOCYTES # BLD: 0.9 K/MCL (ref 0.3–0.9)
MONOCYTES NFR BLD: 10 %
NEUTROPHILS # BLD: 4.6 K/MCL (ref 1.8–8)
NEUTROPHILS NFR BLD: 55 %
NRBC BLD MANUAL-RTO: 0 /100 WBC
PLATELET # BLD AUTO: 279 K/MCL (ref 140–450)
RBC # BLD: 5.11 MIL/MCL (ref 4–5.2)
WBC # BLD: 8.4 K/MCL (ref 4.2–11)

## 2025-02-07 PROCEDURE — 36415 COLL VENOUS BLD VENIPUNCTURE: CPT | Performed by: STUDENT IN AN ORGANIZED HEALTH CARE EDUCATION/TRAINING PROGRAM

## 2025-02-07 PROCEDURE — 83690 ASSAY OF LIPASE: CPT | Performed by: CLINICAL MEDICAL LABORATORY

## 2025-02-07 PROCEDURE — 85025 COMPLETE CBC W/AUTO DIFF WBC: CPT | Performed by: CLINICAL MEDICAL LABORATORY

## 2025-02-07 PROCEDURE — 86003 ALLG SPEC IGE CRUDE XTRC EA: CPT | Performed by: CLINICAL MEDICAL LABORATORY

## 2025-02-07 PROCEDURE — 80053 COMPREHEN METABOLIC PANEL: CPT | Performed by: CLINICAL MEDICAL LABORATORY

## 2025-02-07 ASSESSMENT — PATIENT HEALTH QUESTIONNAIRE - PHQ9
CLINICAL INTERPRETATION OF PHQ2 SCORE: NO FURTHER SCREENING NEEDED
1. LITTLE INTEREST OR PLEASURE IN DOING THINGS: NOT AT ALL
SUM OF ALL RESPONSES TO PHQ9 QUESTIONS 1 AND 2: 0
2. FEELING DOWN, DEPRESSED OR HOPELESS: NOT AT ALL
SUM OF ALL RESPONSES TO PHQ9 QUESTIONS 1 AND 2: 0

## 2025-02-07 ASSESSMENT — PAIN SCALES - GENERAL: PAINLEVEL: 6

## 2025-02-08 LAB
ALBUMIN SERPL-MCNC: 4.2 G/DL (ref 3.4–5)
ALBUMIN/GLOB SERPL: 1.2 {RATIO} (ref 1–2.4)
ALP SERPL-CCNC: 48 UNITS/L (ref 42–110)
ALT SERPL-CCNC: 15 UNITS/L
ANION GAP SERPL CALC-SCNC: 6 MMOL/L (ref 7–19)
AST SERPL-CCNC: 20 UNITS/L
BILIRUB SERPL-MCNC: 0.3 MG/DL (ref 0.2–1)
BUN SERPL-MCNC: 13 MG/DL (ref 6–20)
BUN/CREAT SERPL: 21 (ref 7–25)
CALCIUM SERPL-MCNC: 9.6 MG/DL (ref 8.4–10.2)
CHLORIDE SERPL-SCNC: 107 MMOL/L (ref 97–110)
CO2 SERPL-SCNC: 28 MMOL/L (ref 21–32)
CREAT SERPL-MCNC: 0.63 MG/DL (ref 0.51–0.95)
EGFRCR SERPLBLD CKD-EPI 2021: >90 ML/MIN/{1.73_M2}
FASTING DURATION TIME PATIENT: 0 HOURS (ref 0–999)
GLOBULIN SER-MCNC: 3.6 G/DL (ref 2–4)
GLUCOSE SERPL-MCNC: 76 MG/DL (ref 70–99)
LIPASE SERPL-CCNC: 126 UNITS/L (ref 15–77)
POTASSIUM SERPL-SCNC: 4.3 MMOL/L (ref 3.4–5.1)
PROT SERPL-MCNC: 7.8 G/DL (ref 6.4–8.2)
SODIUM SERPL-SCNC: 137 MMOL/L (ref 135–145)

## 2025-02-10 ENCOUNTER — E-ADVICE (OUTPATIENT)
Dept: FAMILY MEDICINE | Age: 20
End: 2025-02-10

## 2025-02-12 LAB
ALMOND IGE QN: <0.1 KU/L
BEEF IGE QN: <0.1 KU/L
BRAZIL NUT IGE QN: <0.1 KU/L
CASHEW NUT IGE QN: <0.1 KU/L
CHICKEN MEAT IGE QN: <0.1 KU/L
CLAM IGE QN: <0.1 KU/L
CODFISH IGE QN: <0.1 KU/L
CORN IGE QN: <0.1 KU/L
COW MILK IGE QN: 0.27 KU/L
CRAB IGE QN: <0.1 KU/L
EGG WHITE IGE QN: 0.1 KU/L
EGG YOLK IGE QN: <0.1 KU/L
HAZELNUT IGE QN: <0.1 KU/L
LOBSTER IGE QN: <0.1 KU/L
MACKEREL IGE QN: <0.1 KU/L
PEANUT IGE QN: <0.1 KU/L
PECAN/HICK NUT IGE QN: <0.1 KU/L
PISTACHIO IGE QN: <0.1 KU/L
PORK IGE QN: <0.1 KU/L
SALMON IGE QN: <0.1 KU/L
SCALLOP IGE QN: <0.1 KU/L
SHRIMP IGE QN: <0.1 KU/L
SOYBEAN IGE QN: <0.1 KU/L
TUNA IGE QN: <0.1 KU/L
WALNUT IGE QN: <0.1 KU/L
WHEAT IGE QN: <0.1 KU/L

## 2025-03-05 ENCOUNTER — APPOINTMENT (OUTPATIENT)
Dept: GASTROENTEROLOGY | Age: 20
End: 2025-03-05

## 2025-03-05 VITALS
SYSTOLIC BLOOD PRESSURE: 102 MMHG | DIASTOLIC BLOOD PRESSURE: 69 MMHG | BODY MASS INDEX: 17.48 KG/M2 | HEIGHT: 62 IN | RESPIRATION RATE: 16 BRPM | WEIGHT: 95 LBS | HEART RATE: 102 BPM | OXYGEN SATURATION: 98 %

## 2025-03-05 DIAGNOSIS — R11.0 CHRONIC NAUSEA: ICD-10-CM

## 2025-03-05 DIAGNOSIS — Q27.30 AVM (ARTERIOVENOUS MALFORMATION) (CMD): ICD-10-CM

## 2025-03-05 DIAGNOSIS — R10.13 EPIGASTRIC PAIN: Primary | ICD-10-CM

## 2025-03-05 DIAGNOSIS — K92.2 UGIB (UPPER GASTROINTESTINAL BLEED): ICD-10-CM

## 2025-03-05 PROCEDURE — 99214 OFFICE O/P EST MOD 30 MIN: CPT

## 2025-03-05 ASSESSMENT — ENCOUNTER SYMPTOMS
ABDOMINAL PAIN: 1
PSYCHIATRIC NEGATIVE: 1
CONSTITUTIONAL NEGATIVE: 1
NAUSEA: 1

## 2025-04-29 ENCOUNTER — TELEPHONE (OUTPATIENT)
Dept: GASTROENTEROLOGY | Age: 20
End: 2025-04-29

## 2025-05-31 ENCOUNTER — E-ADVICE (OUTPATIENT)
Dept: FAMILY MEDICINE | Age: 20
End: 2025-05-31

## 2025-05-31 ENCOUNTER — E-ADVICE (OUTPATIENT)
Dept: GASTROENTEROLOGY | Age: 20
End: 2025-05-31

## 2025-06-03 ENCOUNTER — APPOINTMENT (OUTPATIENT)
Dept: FAMILY MEDICINE | Age: 20
End: 2025-06-03

## 2025-06-18 ENCOUNTER — LAB SERVICES (OUTPATIENT)
Dept: LAB | Age: 20
End: 2025-06-18

## 2025-06-18 ENCOUNTER — OFFICE VISIT (OUTPATIENT)
Dept: GASTROENTEROLOGY | Age: 20
End: 2025-06-18

## 2025-06-18 ENCOUNTER — TELEPHONE (OUTPATIENT)
Dept: GASTROENTEROLOGY | Age: 20
End: 2025-06-18

## 2025-06-18 ENCOUNTER — APPOINTMENT (OUTPATIENT)
Dept: GASTROENTEROLOGY | Age: 20
End: 2025-06-18

## 2025-06-18 VITALS
HEIGHT: 62 IN | WEIGHT: 92.15 LBS | HEART RATE: 74 BPM | SYSTOLIC BLOOD PRESSURE: 105 MMHG | DIASTOLIC BLOOD PRESSURE: 71 MMHG | BODY MASS INDEX: 16.96 KG/M2 | OXYGEN SATURATION: 96 %

## 2025-06-18 VITALS
SYSTOLIC BLOOD PRESSURE: 101 MMHG | DIASTOLIC BLOOD PRESSURE: 70 MMHG | HEIGHT: 62 IN | WEIGHT: 95.02 LBS | BODY MASS INDEX: 17.49 KG/M2

## 2025-06-18 DIAGNOSIS — R10.13 EPIGASTRIC PAIN: ICD-10-CM

## 2025-06-18 DIAGNOSIS — Q27.30 AVM (ARTERIOVENOUS MALFORMATION) (CMD): ICD-10-CM

## 2025-06-18 DIAGNOSIS — K92.2 UPPER GI BLEED: ICD-10-CM

## 2025-06-18 DIAGNOSIS — R63.4 WEIGHT LOSS, UNINTENTIONAL: Primary | ICD-10-CM

## 2025-06-18 DIAGNOSIS — R11.0 NAUSEA: ICD-10-CM

## 2025-06-18 DIAGNOSIS — K92.0 HEMATEMESIS WITH NAUSEA: ICD-10-CM

## 2025-06-18 DIAGNOSIS — R11.0 CHRONIC NAUSEA: ICD-10-CM

## 2025-06-18 DIAGNOSIS — R63.4 WEIGHT LOSS, UNINTENTIONAL: ICD-10-CM

## 2025-06-18 LAB
BASOPHILS # BLD: 0.1 K/MCL (ref 0–0.3)
BASOPHILS NFR BLD: 1 %
DEPRECATED RDW RBC: 43.9 FL (ref 39–50)
EOSINOPHIL # BLD: 0.3 K/MCL (ref 0–0.5)
EOSINOPHIL NFR BLD: 3 %
ERYTHROCYTE [DISTWIDTH] IN BLOOD: 15.3 % (ref 11–15)
HCT VFR BLD CALC: 41.5 % (ref 36–46.5)
HGB BLD-MCNC: 13 G/DL (ref 12–15.5)
IMM GRANULOCYTES # BLD AUTO: 0 K/MCL (ref 0–0.2)
IMM GRANULOCYTES # BLD: 0 %
LYMPHOCYTES # BLD: 2.5 K/MCL (ref 1.2–5.2)
LYMPHOCYTES NFR BLD: 32 %
MCH RBC QN AUTO: 25 PG (ref 26–34)
MCHC RBC AUTO-ENTMCNC: 31.3 G/DL (ref 32–36.5)
MCV RBC AUTO: 79.7 FL (ref 78–100)
MONOCYTES # BLD: 0.6 K/MCL (ref 0.3–0.9)
MONOCYTES NFR BLD: 8 %
NEUTROPHILS # BLD: 4.3 K/MCL (ref 1.8–8)
NEUTROPHILS NFR BLD: 56 %
NRBC BLD MANUAL-RTO: 0 /100 WBC
PLATELET # BLD AUTO: 252 K/MCL (ref 140–450)
RBC # BLD: 5.21 MIL/MCL (ref 4–5.2)
WBC # BLD: 7.7 K/MCL (ref 4.2–11)

## 2025-06-18 PROCEDURE — 36415 COLL VENOUS BLD VENIPUNCTURE: CPT

## 2025-06-18 PROCEDURE — 85025 COMPLETE CBC W/AUTO DIFF WBC: CPT | Performed by: CLINICAL MEDICAL LABORATORY

## 2025-06-18 ASSESSMENT — PATIENT HEALTH QUESTIONNAIRE - PHQ9: SUM OF ALL RESPONSES TO PHQ9 QUESTIONS 1 AND 2: 0

## 2025-06-18 ASSESSMENT — PAIN SCALES - GENERAL
PAINLEVEL_OUTOF10: 5
PAINLEVEL_OUTOF10: 8

## 2025-06-20 ENCOUNTER — E-ADVICE (OUTPATIENT)
Dept: GASTROENTEROLOGY | Age: 20
End: 2025-06-20

## 2025-06-20 ENCOUNTER — RESULTS FOLLOW-UP (OUTPATIENT)
Dept: GASTROENTEROLOGY | Age: 20
End: 2025-06-20

## 2025-06-23 ENCOUNTER — E-ADVICE (OUTPATIENT)
Dept: GASTROENTEROLOGY | Age: 20
End: 2025-06-23

## 2025-06-23 DIAGNOSIS — K92.2 UPPER GI BLEED: Primary | ICD-10-CM

## 2025-06-23 DIAGNOSIS — R63.4 WEIGHT LOSS, UNINTENTIONAL: ICD-10-CM

## 2025-06-23 DIAGNOSIS — R11.0 NAUSEA: ICD-10-CM

## 2025-06-23 DIAGNOSIS — K92.2 UPPER GI BLEED: ICD-10-CM

## 2025-06-23 DIAGNOSIS — R10.13 EPIGASTRIC PAIN: ICD-10-CM

## 2025-06-23 DIAGNOSIS — R63.4 WEIGHT LOSS: Primary | ICD-10-CM

## 2025-07-10 ENCOUNTER — APPOINTMENT (OUTPATIENT)
Age: 20
End: 2025-07-10

## 2025-07-11 ENCOUNTER — APPOINTMENT (OUTPATIENT)
Dept: MRI IMAGING | Age: 20
End: 2025-07-11

## 2025-07-11 ENCOUNTER — TELEPHONE (OUTPATIENT)
Dept: GASTROENTEROLOGY | Age: 20
End: 2025-07-11

## 2025-07-21 ENCOUNTER — APPOINTMENT (OUTPATIENT)
Age: 20
End: 2025-07-21

## 2025-08-06 ENCOUNTER — HOSPITAL ENCOUNTER (OUTPATIENT)
Dept: MRI IMAGING | Age: 20
Discharge: HOME | End: 2025-08-06

## 2025-08-06 DIAGNOSIS — R10.13 EPIGASTRIC PAIN: ICD-10-CM

## 2025-08-06 DIAGNOSIS — R11.0 NAUSEA: ICD-10-CM

## 2025-08-06 DIAGNOSIS — K92.2 UPPER GI BLEED: ICD-10-CM

## 2025-08-06 DIAGNOSIS — R63.4 WEIGHT LOSS: ICD-10-CM

## 2025-08-06 PROCEDURE — A9577 INJ MULTIHANCE: HCPCS

## 2025-08-06 PROCEDURE — 10002805 HB CONTRAST AGENT

## 2025-08-06 PROCEDURE — 74183 MRI ABD W/O CNTR FLWD CNTR: CPT

## 2025-08-06 RX ADMIN — GADOBENATE DIMEGLUMINE 10 ML: 529 INJECTION, SOLUTION INTRAVENOUS at 14:14

## 2025-08-07 ENCOUNTER — RESULTS FOLLOW-UP (OUTPATIENT)
Dept: GASTROENTEROLOGY | Age: 20
End: 2025-08-07

## 2025-09-08 ENCOUNTER — APPOINTMENT (OUTPATIENT)
Dept: FAMILY MEDICINE | Age: 20
End: 2025-09-08

## (undated) DEVICE — 10FT COMBINED O2 DELIVERY/CO2 MONITORING. FILTER WITH MICROSTREAM TYPE LUER: Brand: DUAL ADULT NASAL CANNULA

## (undated) DEVICE — MEDI-VAC NON-CONDUCTIVE SUCTION TUBING: Brand: CARDINAL HEALTH

## (undated) DEVICE — GIJAW SINGLE-USE BIOPSY FORCEPS WITH NEEDLE: Brand: GIJAW

## (undated) DEVICE — FIAPC® PROBE W/ FILTER 2200 A OD 2.3MM/6.9FR; L 2.2M/7.2FT: Brand: ERBE

## (undated) DEVICE — BIPOLAR ELECTROHEMOSTASIS CATHETER: Brand: GOLD PROBE

## (undated) DEVICE — V2 SPECIMEN COLLECTION MANIFOLD KIT: Brand: NEPTUNE

## (undated) DEVICE — KIT CUSTOM ENDOPROCEDURE STERIS

## (undated) DEVICE — 1200CC GUARDIAN II: Brand: GUARDIAN

## (undated) DEVICE — 3M™ RED DOT™ MONITORING ELECTRODE WITH FOAM TAPE AND STICKY GEL 2570-3, 3/BAG, 200/CASE, 54/PLT: Brand: RED DOT™

## (undated) DEVICE — BITEBLOCK ENDOSCP 60FR MAXI STRP

## (undated) DEVICE — KIT VLV 5 PC AIR H2O SUCT BX ENDOGATOR CONN

## (undated) DEVICE — 3M™ RED DOT™ MONITORING ELECTRODE WITH FOAM TAPE AND STICKY GEL, 50/BAG, 20/CASE, 72/PLT 2570: Brand: RED DOT™

## (undated) NOTE — LETTER
25          Norm Roque  :  3/25/2005      To Whom It May Concern:    This patient was seen at [facility] from 2025-25 .  Patient may now return to school with no restrictions.    May return to school status per above effective 2025.          Sincerely,        Bhavna KABA RN